# Patient Record
Sex: FEMALE | Race: WHITE | NOT HISPANIC OR LATINO
[De-identification: names, ages, dates, MRNs, and addresses within clinical notes are randomized per-mention and may not be internally consistent; named-entity substitution may affect disease eponyms.]

---

## 2017-07-06 ENCOUNTER — APPOINTMENT (OUTPATIENT)
Dept: OPHTHALMOLOGY | Facility: CLINIC | Age: 32
End: 2017-07-06

## 2017-07-06 PROBLEM — Z00.00 ENCOUNTER FOR PREVENTIVE HEALTH EXAMINATION: Status: ACTIVE | Noted: 2017-07-06

## 2017-07-11 ENCOUNTER — APPOINTMENT (OUTPATIENT)
Dept: OPHTHALMOLOGY | Facility: CLINIC | Age: 32
End: 2017-07-11

## 2017-07-26 ENCOUNTER — APPOINTMENT (OUTPATIENT)
Dept: MRI IMAGING | Facility: IMAGING CENTER | Age: 32
End: 2017-07-26

## 2017-08-15 ENCOUNTER — APPOINTMENT (OUTPATIENT)
Dept: MRI IMAGING | Facility: CLINIC | Age: 32
End: 2017-08-15

## 2018-11-01 ENCOUNTER — TRANSCRIPTION ENCOUNTER (OUTPATIENT)
Age: 33
End: 2018-11-01

## 2018-11-01 ENCOUNTER — INPATIENT (INPATIENT)
Facility: HOSPITAL | Age: 33
LOS: 3 days | Discharge: HOME | End: 2018-11-05
Attending: OBSTETRICS & GYNECOLOGY | Admitting: OBSTETRICS & GYNECOLOGY
Payer: MEDICAID

## 2018-11-01 DIAGNOSIS — Z3A.00 WEEKS OF GESTATION OF PREGNANCY NOT SPECIFIED: ICD-10-CM

## 2018-11-01 DIAGNOSIS — O26.899 OTHER SPECIFIED PREGNANCY RELATED CONDITIONS, UNSPECIFIED TRIMESTER: ICD-10-CM

## 2018-11-02 ENCOUNTER — RESULT REVIEW (OUTPATIENT)
Age: 33
End: 2018-11-02

## 2018-11-02 VITALS — HEIGHT: 64 IN | WEIGHT: 182.98 LBS

## 2018-11-02 DIAGNOSIS — Z34.80 ENCOUNTER FOR SUPERVISION OF OTHER NORMAL PREGNANCY, UNSPECIFIED TRIMESTER: ICD-10-CM

## 2018-11-02 LAB
BASOPHILS # BLD AUTO: 0 K/UL — SIGNIFICANT CHANGE UP (ref 0–0.2)
BASOPHILS NFR BLD AUTO: 0.1 % — SIGNIFICANT CHANGE UP (ref 0–2)
BLD GP AB SCN SERPL QL: NEGATIVE — SIGNIFICANT CHANGE UP
EOSINOPHIL # BLD AUTO: 0 K/UL — SIGNIFICANT CHANGE UP (ref 0–0.5)
EOSINOPHIL NFR BLD AUTO: 0.2 % — SIGNIFICANT CHANGE UP (ref 0–6)
HBV SURFACE AG SERPL QL IA: SIGNIFICANT CHANGE UP
HCT VFR BLD CALC: 37.4 % — SIGNIFICANT CHANGE UP (ref 34.5–45)
HGB BLD-MCNC: 12.6 G/DL — SIGNIFICANT CHANGE UP (ref 11.5–15.5)
HIV 1 & 2 AB SERPL IA.RAPID: SIGNIFICANT CHANGE UP
HIV 1+2 AB+HIV1 P24 AG SERPL QL IA: SIGNIFICANT CHANGE UP
LYMPHOCYTES # BLD AUTO: 1.3 K/UL — SIGNIFICANT CHANGE UP (ref 1–3.3)
LYMPHOCYTES # BLD AUTO: 7.8 % — LOW (ref 13–44)
MCHC RBC-ENTMCNC: 28 PG — SIGNIFICANT CHANGE UP (ref 27–34)
MCHC RBC-ENTMCNC: 33.7 GM/DL — SIGNIFICANT CHANGE UP (ref 32–36)
MCV RBC AUTO: 83 FL — SIGNIFICANT CHANGE UP (ref 80–100)
MONOCYTES # BLD AUTO: 0.6 K/UL — SIGNIFICANT CHANGE UP (ref 0–0.9)
MONOCYTES NFR BLD AUTO: 3.8 % — SIGNIFICANT CHANGE UP (ref 2–14)
NEUTROPHILS # BLD AUTO: 14.8 K/UL — HIGH (ref 1.8–7.4)
NEUTROPHILS NFR BLD AUTO: 88.1 % — HIGH (ref 43–77)
PLATELET # BLD AUTO: 216 K/UL — SIGNIFICANT CHANGE UP (ref 150–400)
RBC # BLD: 4.5 M/UL — SIGNIFICANT CHANGE UP (ref 3.8–5.2)
RBC # FLD: 14.7 % — HIGH (ref 10.3–14.5)
RH IG SCN BLD-IMP: POSITIVE — SIGNIFICANT CHANGE UP
RH IG SCN BLD-IMP: POSITIVE — SIGNIFICANT CHANGE UP
RUBV IGG SER-ACNC: 7.8 INDEX — SIGNIFICANT CHANGE UP
RUBV IGG SER-IMP: POSITIVE — SIGNIFICANT CHANGE UP
T PALLIDUM AB TITR SER: NEGATIVE — SIGNIFICANT CHANGE UP
WBC # BLD: 16.9 K/UL — HIGH (ref 3.8–10.5)
WBC # FLD AUTO: 16.9 K/UL — HIGH (ref 3.8–10.5)

## 2018-11-02 PROCEDURE — 59514 CESAREAN DELIVERY ONLY: CPT | Mod: U7

## 2018-11-02 RX ORDER — NALOXONE HYDROCHLORIDE 4 MG/.1ML
0.1 SPRAY NASAL
Qty: 0 | Refills: 0 | Status: DISCONTINUED | OUTPATIENT
Start: 2018-11-02 | End: 2018-11-04

## 2018-11-02 RX ORDER — SODIUM CHLORIDE 9 MG/ML
1000 INJECTION, SOLUTION INTRAVENOUS
Qty: 0 | Refills: 0 | Status: DISCONTINUED | OUTPATIENT
Start: 2018-11-02 | End: 2018-11-05

## 2018-11-02 RX ORDER — DEXAMETHASONE 0.5 MG/5ML
4 ELIXIR ORAL EVERY 6 HOURS
Qty: 0 | Refills: 0 | Status: DISCONTINUED | OUTPATIENT
Start: 2018-11-02 | End: 2018-11-04

## 2018-11-02 RX ORDER — LANOLIN
1 OINTMENT (GRAM) TOPICAL
Qty: 0 | Refills: 0 | Status: DISCONTINUED | OUTPATIENT
Start: 2018-11-02 | End: 2018-11-05

## 2018-11-02 RX ORDER — OXYTOCIN 10 UNIT/ML
2 VIAL (ML) INJECTION
Qty: 30 | Refills: 0 | Status: DISCONTINUED | OUTPATIENT
Start: 2018-11-02 | End: 2018-11-05

## 2018-11-02 RX ORDER — OXYTOCIN 10 UNIT/ML
333.33 VIAL (ML) INJECTION
Qty: 20 | Refills: 0 | Status: DISCONTINUED | OUTPATIENT
Start: 2018-11-02 | End: 2018-11-05

## 2018-11-02 RX ORDER — ACETAMINOPHEN 500 MG
1000 TABLET ORAL ONCE
Qty: 0 | Refills: 0 | Status: COMPLETED | OUTPATIENT
Start: 2018-11-02 | End: 2018-11-02

## 2018-11-02 RX ORDER — SIMETHICONE 80 MG/1
80 TABLET, CHEWABLE ORAL EVERY 4 HOURS
Qty: 0 | Refills: 0 | Status: DISCONTINUED | OUTPATIENT
Start: 2018-11-02 | End: 2018-11-05

## 2018-11-02 RX ORDER — OXYTOCIN 10 UNIT/ML
41.67 VIAL (ML) INJECTION
Qty: 20 | Refills: 0 | Status: DISCONTINUED | OUTPATIENT
Start: 2018-11-02 | End: 2018-11-02

## 2018-11-02 RX ORDER — IBUPROFEN 200 MG
600 TABLET ORAL EVERY 6 HOURS
Qty: 0 | Refills: 0 | Status: COMPLETED | OUTPATIENT
Start: 2018-11-02 | End: 2019-10-01

## 2018-11-02 RX ORDER — TETANUS TOXOID, REDUCED DIPHTHERIA TOXOID AND ACELLULAR PERTUSSIS VACCINE, ADSORBED 5; 2.5; 8; 8; 2.5 [IU]/.5ML; [IU]/.5ML; UG/.5ML; UG/.5ML; UG/.5ML
0.5 SUSPENSION INTRAMUSCULAR ONCE
Qty: 0 | Refills: 0 | Status: DISCONTINUED | OUTPATIENT
Start: 2018-11-02 | End: 2018-11-05

## 2018-11-02 RX ORDER — SODIUM CHLORIDE 9 MG/ML
1000 INJECTION, SOLUTION INTRAVENOUS
Qty: 0 | Refills: 0 | Status: DISCONTINUED | OUTPATIENT
Start: 2018-11-02 | End: 2018-11-02

## 2018-11-02 RX ORDER — GENTAMICIN SULFATE 40 MG/ML
400 VIAL (ML) INJECTION EVERY 24 HOURS
Qty: 0 | Refills: 0 | Status: DISCONTINUED | OUTPATIENT
Start: 2018-11-02 | End: 2018-11-04

## 2018-11-02 RX ORDER — DOCUSATE SODIUM 100 MG
100 CAPSULE ORAL
Qty: 0 | Refills: 0 | Status: DISCONTINUED | OUTPATIENT
Start: 2018-11-02 | End: 2018-11-03

## 2018-11-02 RX ORDER — ONDANSETRON 8 MG/1
4 TABLET, FILM COATED ORAL EVERY 6 HOURS
Qty: 0 | Refills: 0 | Status: DISCONTINUED | OUTPATIENT
Start: 2018-11-02 | End: 2018-11-04

## 2018-11-02 RX ORDER — FERROUS SULFATE 325(65) MG
1 TABLET ORAL
Qty: 0 | Refills: 0 | COMMUNITY

## 2018-11-02 RX ORDER — OXYTOCIN 10 UNIT/ML
41.67 VIAL (ML) INJECTION
Qty: 20 | Refills: 0 | Status: DISCONTINUED | OUTPATIENT
Start: 2018-11-02 | End: 2018-11-05

## 2018-11-02 RX ORDER — OXYCODONE HYDROCHLORIDE 5 MG/1
5 TABLET ORAL
Qty: 0 | Refills: 0 | Status: COMPLETED | OUTPATIENT
Start: 2018-11-02 | End: 2018-11-09

## 2018-11-02 RX ORDER — KETOROLAC TROMETHAMINE 30 MG/ML
30 SYRINGE (ML) INJECTION EVERY 6 HOURS
Qty: 0 | Refills: 0 | Status: DISCONTINUED | OUTPATIENT
Start: 2018-11-02 | End: 2018-11-04

## 2018-11-02 RX ORDER — CITRIC ACID/SODIUM CITRATE 300-500 MG
15 SOLUTION, ORAL ORAL EVERY 4 HOURS
Qty: 0 | Refills: 0 | Status: DISCONTINUED | OUTPATIENT
Start: 2018-11-02 | End: 2018-11-02

## 2018-11-02 RX ORDER — AMPICILLIN TRIHYDRATE 250 MG
2 CAPSULE ORAL EVERY 6 HOURS
Qty: 0 | Refills: 0 | Status: DISCONTINUED | OUTPATIENT
Start: 2018-11-02 | End: 2018-11-03

## 2018-11-02 RX ORDER — FERROUS SULFATE 325(65) MG
325 TABLET ORAL DAILY
Qty: 0 | Refills: 0 | Status: DISCONTINUED | OUTPATIENT
Start: 2018-11-02 | End: 2018-11-03

## 2018-11-02 RX ORDER — GLYCERIN ADULT
1 SUPPOSITORY, RECTAL RECTAL AT BEDTIME
Qty: 0 | Refills: 0 | Status: DISCONTINUED | OUTPATIENT
Start: 2018-11-02 | End: 2018-11-05

## 2018-11-02 RX ORDER — ACETAMINOPHEN 500 MG
975 TABLET ORAL EVERY 6 HOURS
Qty: 0 | Refills: 0 | Status: DISCONTINUED | OUTPATIENT
Start: 2018-11-02 | End: 2018-11-05

## 2018-11-02 RX ORDER — OXYCODONE HYDROCHLORIDE 5 MG/1
5 TABLET ORAL EVERY 4 HOURS
Qty: 0 | Refills: 0 | Status: COMPLETED | OUTPATIENT
Start: 2018-11-02 | End: 2018-11-09

## 2018-11-02 RX ORDER — OXYTOCIN 10 UNIT/ML
333.33 VIAL (ML) INJECTION
Qty: 20 | Refills: 0 | Status: DISCONTINUED | OUTPATIENT
Start: 2018-11-02 | End: 2018-11-02

## 2018-11-02 RX ORDER — INFLUENZA VIRUS VACCINE 15; 15; 15; 15 UG/.5ML; UG/.5ML; UG/.5ML; UG/.5ML
0.5 SUSPENSION INTRAMUSCULAR ONCE
Qty: 0 | Refills: 0 | Status: COMPLETED | OUTPATIENT
Start: 2018-11-02 | End: 2018-11-04

## 2018-11-02 RX ORDER — DIPHENHYDRAMINE HCL 50 MG
25 CAPSULE ORAL EVERY 6 HOURS
Qty: 0 | Refills: 0 | Status: DISCONTINUED | OUTPATIENT
Start: 2018-11-02 | End: 2018-11-05

## 2018-11-02 RX ORDER — SODIUM CHLORIDE 9 MG/ML
1000 INJECTION, SOLUTION INTRAVENOUS ONCE
Qty: 0 | Refills: 0 | Status: COMPLETED | OUTPATIENT
Start: 2018-11-02 | End: 2018-11-02

## 2018-11-02 RX ORDER — HEPARIN SODIUM 5000 [USP'U]/ML
5000 INJECTION INTRAVENOUS; SUBCUTANEOUS EVERY 12 HOURS
Qty: 0 | Refills: 0 | Status: DISCONTINUED | OUTPATIENT
Start: 2018-11-02 | End: 2018-11-05

## 2018-11-02 RX ADMIN — HEPARIN SODIUM 5000 UNIT(S): 5000 INJECTION INTRAVENOUS; SUBCUTANEOUS at 18:45

## 2018-11-02 RX ADMIN — SIMETHICONE 80 MILLIGRAM(S): 80 TABLET, CHEWABLE ORAL at 17:52

## 2018-11-02 RX ADMIN — Medication 2 MILLIUNIT(S)/MIN: at 06:23

## 2018-11-02 RX ADMIN — Medication 30 MILLIGRAM(S): at 18:39

## 2018-11-02 RX ADMIN — SODIUM CHLORIDE 125 MILLILITER(S): 9 INJECTION, SOLUTION INTRAVENOUS at 02:35

## 2018-11-02 RX ADMIN — Medication 975 MILLIGRAM(S): at 18:39

## 2018-11-02 RX ADMIN — Medication 30 MILLIGRAM(S): at 12:57

## 2018-11-02 RX ADMIN — Medication 216 GRAM(S): at 13:02

## 2018-11-02 RX ADMIN — Medication 975 MILLIGRAM(S): at 17:52

## 2018-11-02 RX ADMIN — Medication 150 MILLIGRAM(S): at 15:55

## 2018-11-02 RX ADMIN — Medication 216 GRAM(S): at 17:53

## 2018-11-02 RX ADMIN — Medication 400 MILLIGRAM(S): at 10:43

## 2018-11-02 RX ADMIN — SODIUM CHLORIDE 2000 MILLILITER(S): 9 INJECTION, SOLUTION INTRAVENOUS at 01:45

## 2018-11-02 RX ADMIN — Medication 30 MILLIGRAM(S): at 17:52

## 2018-11-03 LAB
BASOPHILS # BLD AUTO: 0.01 K/UL — SIGNIFICANT CHANGE UP (ref 0–0.2)
BASOPHILS NFR BLD AUTO: 0.1 % — SIGNIFICANT CHANGE UP (ref 0–2)
EOSINOPHIL # BLD AUTO: 0.11 K/UL — SIGNIFICANT CHANGE UP (ref 0–0.5)
EOSINOPHIL NFR BLD AUTO: 0.9 % — SIGNIFICANT CHANGE UP (ref 0–6)
HCT VFR BLD CALC: 25 % — LOW (ref 34.5–45)
HGB BLD-MCNC: 8.2 G/DL — LOW (ref 11.5–15.5)
IMM GRANULOCYTES NFR BLD AUTO: 0.3 % — SIGNIFICANT CHANGE UP (ref 0–1.5)
LYMPHOCYTES # BLD AUTO: 1.84 K/UL — SIGNIFICANT CHANGE UP (ref 1–3.3)
LYMPHOCYTES # BLD AUTO: 15.1 % — SIGNIFICANT CHANGE UP (ref 13–44)
MCHC RBC-ENTMCNC: 27.8 PG — SIGNIFICANT CHANGE UP (ref 27–34)
MCHC RBC-ENTMCNC: 32.8 GM/DL — SIGNIFICANT CHANGE UP (ref 32–36)
MCV RBC AUTO: 84.7 FL — SIGNIFICANT CHANGE UP (ref 80–100)
MONOCYTES # BLD AUTO: 0.8 K/UL — SIGNIFICANT CHANGE UP (ref 0–0.9)
MONOCYTES NFR BLD AUTO: 6.6 % — SIGNIFICANT CHANGE UP (ref 2–14)
NEUTROPHILS # BLD AUTO: 9.4 K/UL — HIGH (ref 1.8–7.4)
NEUTROPHILS NFR BLD AUTO: 77 % — SIGNIFICANT CHANGE UP (ref 43–77)
PLATELET # BLD AUTO: 138 K/UL — LOW (ref 150–400)
RBC # BLD: 2.95 M/UL — LOW (ref 3.8–5.2)
RBC # FLD: 16 % — HIGH (ref 10.3–14.5)
WBC # BLD: 12.2 K/UL — HIGH (ref 3.8–10.5)
WBC # FLD AUTO: 12.2 K/UL — HIGH (ref 3.8–10.5)

## 2018-11-03 RX ORDER — FERROUS SULFATE 325(65) MG
325 TABLET ORAL THREE TIMES A DAY
Qty: 0 | Refills: 0 | Status: DISCONTINUED | OUTPATIENT
Start: 2018-11-03 | End: 2018-11-05

## 2018-11-03 RX ORDER — DOCUSATE SODIUM 100 MG
100 CAPSULE ORAL THREE TIMES A DAY
Qty: 0 | Refills: 0 | Status: DISCONTINUED | OUTPATIENT
Start: 2018-11-03 | End: 2018-11-05

## 2018-11-03 RX ORDER — ASCORBIC ACID 60 MG
500 TABLET,CHEWABLE ORAL DAILY
Qty: 0 | Refills: 0 | Status: DISCONTINUED | OUTPATIENT
Start: 2018-11-03 | End: 2018-11-05

## 2018-11-03 RX ADMIN — Medication 975 MILLIGRAM(S): at 05:27

## 2018-11-03 RX ADMIN — Medication 1 TABLET(S): at 11:23

## 2018-11-03 RX ADMIN — Medication 30 MILLIGRAM(S): at 12:00

## 2018-11-03 RX ADMIN — Medication 30 MILLIGRAM(S): at 00:01

## 2018-11-03 RX ADMIN — Medication 100 MILLIGRAM(S): at 22:04

## 2018-11-03 RX ADMIN — Medication 325 MILLIGRAM(S): at 11:24

## 2018-11-03 RX ADMIN — SIMETHICONE 80 MILLIGRAM(S): 80 TABLET, CHEWABLE ORAL at 05:25

## 2018-11-03 RX ADMIN — Medication 100 MILLIGRAM(S): at 06:35

## 2018-11-03 RX ADMIN — SIMETHICONE 80 MILLIGRAM(S): 80 TABLET, CHEWABLE ORAL at 00:03

## 2018-11-03 RX ADMIN — HEPARIN SODIUM 5000 UNIT(S): 5000 INJECTION INTRAVENOUS; SUBCUTANEOUS at 18:26

## 2018-11-03 RX ADMIN — Medication 216 GRAM(S): at 11:25

## 2018-11-03 RX ADMIN — Medication 975 MILLIGRAM(S): at 12:00

## 2018-11-03 RX ADMIN — Medication 975 MILLIGRAM(S): at 01:00

## 2018-11-03 RX ADMIN — Medication 975 MILLIGRAM(S): at 18:56

## 2018-11-03 RX ADMIN — Medication 30 MILLIGRAM(S): at 06:25

## 2018-11-03 RX ADMIN — Medication 25 MILLIGRAM(S): at 05:29

## 2018-11-03 RX ADMIN — Medication 325 MILLIGRAM(S): at 22:04

## 2018-11-03 RX ADMIN — Medication 150 MILLIGRAM(S): at 16:00

## 2018-11-03 RX ADMIN — Medication 975 MILLIGRAM(S): at 00:03

## 2018-11-03 RX ADMIN — Medication 975 MILLIGRAM(S): at 18:26

## 2018-11-03 RX ADMIN — Medication 30 MILLIGRAM(S): at 05:26

## 2018-11-03 RX ADMIN — Medication 975 MILLIGRAM(S): at 11:23

## 2018-11-03 RX ADMIN — Medication 30 MILLIGRAM(S): at 01:00

## 2018-11-03 RX ADMIN — SODIUM CHLORIDE 125 MILLILITER(S): 9 INJECTION, SOLUTION INTRAVENOUS at 11:16

## 2018-11-03 RX ADMIN — Medication 500 MILLIGRAM(S): at 16:06

## 2018-11-03 RX ADMIN — Medication 975 MILLIGRAM(S): at 06:25

## 2018-11-03 RX ADMIN — Medication 30 MILLIGRAM(S): at 18:26

## 2018-11-03 RX ADMIN — Medication 216 GRAM(S): at 00:03

## 2018-11-03 RX ADMIN — Medication 100 MILLIGRAM(S): at 14:12

## 2018-11-03 RX ADMIN — Medication 325 MILLIGRAM(S): at 14:12

## 2018-11-03 RX ADMIN — Medication 100 MILLIGRAM(S): at 14:00

## 2018-11-03 RX ADMIN — HEPARIN SODIUM 5000 UNIT(S): 5000 INJECTION INTRAVENOUS; SUBCUTANEOUS at 05:27

## 2018-11-03 RX ADMIN — Medication 30 MILLIGRAM(S): at 11:23

## 2018-11-03 RX ADMIN — Medication 216 GRAM(S): at 05:44

## 2018-11-03 RX ADMIN — Medication 30 MILLIGRAM(S): at 18:56

## 2018-11-03 NOTE — PROGRESS NOTE ADULT - SUBJECTIVE AND OBJECTIVE BOX
Day 1 of Anesthesia Pain Management Service    SUBJECTIVE: I'm okay  Pain Scale Score	At rest: ___ 	With Activity: ___ 	[  x ] Refer to charted pain scores    THERAPY:  [ ] Epidural Bupivacaine 0.0625% and Hydromorphone 10 micrograms/mL  [ ] Epidural Ropivacaine 0.2% plain   [ x ] Epidural Bupivacaine 0.01 % and Fentanyl 3 micrograms/mL  (OB)    Demand dose 3 mL  Lockout  15 minutes   Continuous Rate 10mL    MEDICATIONS  (STANDING):  acetaminophen   Tablet .. 975 milliGRAM(s) Oral every 6 hours  ascorbic acid 500 milliGRAM(s) Oral daily  clindamycin IVPB 900 milliGRAM(s) IV Intermittent every 8 hours  clindamycin IVPB      diphtheria/tetanus/pertussis (acellular) Vaccine (ADAcel) 0.5 milliLiter(s) IntraMuscular once  docusate sodium 100 milliGRAM(s) Oral three times a day  fentaNYL (3 MICROgram(s)/mL) + BUpivacaine 0.01% in 0.9% Sodium Chloride PCEA 250 milliLiter(s) Epidural PCA Continuous  ferrous    sulfate 325 milliGRAM(s) Oral three times a day  gentamicin   IVPB 400 milliGRAM(s) IV Intermittent every 24 hours  heparin  Injectable 5000 Unit(s) SubCutaneous every 12 hours  ibuprofen  Tablet. 600 milliGRAM(s) Oral every 6 hours  influenza   Vaccine 0.5 milliLiter(s) IntraMuscular once  ketorolac   Injectable 30 milliGRAM(s) IV Push every 6 hours  lactated ringers. 1000 milliLiter(s) (125 mL/Hr) IV Continuous <Continuous>  oxyCODONE    IR 5 milliGRAM(s) Oral every 3 hours  oxytocin Infusion 41.667 milliUNIT(s)/Min (125 mL/Hr) IV Continuous <Continuous>  oxytocin Infusion 333.333 milliUNIT(s)/Min (1000 mL/Hr) IV Continuous <Continuous>  oxytocin Infusion 2 milliUNIT(s)/Min (2 mL/Hr) IV Continuous <Continuous>  prenatal multivitamin 1 Tablet(s) Oral daily    MEDICATIONS  (PRN):  dexamethasone  Injectable 4 milliGRAM(s) IV Push every 6 hours PRN Nausea, IF ondansetron is ineffective after 30 - 60 minutes  diphenhydrAMINE 25 milliGRAM(s) Oral every 6 hours PRN Itching  fentaNYL (3 MICROgram(s)/mL) + BUpivacaine 0.01% in 0.9% Sodium Chloride PCEA Rescue Clinician Bolus 5 milliLiter(s) Epidural every 15 minutes PRN Moderate Pain (4 - 6)  glycerin Suppository - Adult 1 Suppository(s) Rectal at bedtime PRN Constipation  lanolin Ointment 1 Application(s) Topical every 3 hours PRN Sore Nipples  naloxone Injectable 0.1 milliGRAM(s) IV Push every 3 minutes PRN For ANY of the following changes in patient status:  A. RR LESS THAN 10 breaths per minute, B. Oxygen saturation LESS THAN 90%, C. Sedation score of 6  ondansetron Injectable 4 milliGRAM(s) IV Push every 6 hours PRN Nausea  oxyCODONE    IR 5 milliGRAM(s) Oral every 4 hours PRN Severe Pain (7 - 10)  simethicone 80 milliGRAM(s) Chew every 4 hours PRN Gas      OBJECTIVE:    Assessment of Epidural Catheter Site: 	    [x  ] Dressing intact	[x ] Site non-tender	[x ] Site without erythema, discharge, edema  [x  ] Epidural tubing and connection checked	[ x] Gross neurological exam within normal limits  [ ] Catheter removed – tip intact		                          8.2    12.20 )-----------( 138      ( 03 Nov 2018 08:35 )             25.0     Vital Signs Last 24 Hrs  T(C): 36.7 (11-03-18 @ 14:00), Max: 38.3 (11-03-18 @ 10:50)  T(F): 98.1 (11-03-18 @ 14:00), Max: 100.9 (11-03-18 @ 10:50)  HR: 113 (11-03-18 @ 14:00) (82 - 130)  BP: 107/73 (11-03-18 @ 14:00) (96/64 - 136/83)  BP(mean): --  RR: 20 (11-03-18 @ 14:00) (17 - 20)  SpO2: 96% (11-03-18 @ 14:00) (96% - 99%)      Sedation Score:	[x ] Alert	[ ] Drowsy	[ ] Arousable  [ ] Asleep  [ ] Unresponsive    Side Effects:	[ x] None	[ ] Nausea	[ ] Vomiting  [ ] Pruritus  		[ ] Weakness  [ ] Numbness  [ ] Other:    ASSESSMENT/ PLAN:    Therapy to  be:	[  x] Continue   [ ] Discontinued   [ ] Change to prn Analgesics    Documentation and Verification of current medications:  [ X ] Done	[ ] Not done, not eligible, reason:    Comments:

## 2018-11-03 NOTE — PROGRESS NOTE ADULT - SUBJECTIVE AND OBJECTIVE BOX
OB Progress Note:  Delivery, POD#1    S: 34yo POD#1 s/p LTCS c/b IPT 38.9. Called to bedside for tachycardia 150s and T 38.3 in addition to shaking. Denies any lightheadedness or dizziness. No N/V/D. No SOB or chest pain.   Her pain is well controlled. She is tolerating a regular diet and passing flatus.   She is ambulating without difficulty.   Voiding spontaneously.     O:   Vital Signs Last 24 Hrs  T(C): 36.9 (2018 05:19), Max: 37.3 (2018 12:30)  T(F): 98.4 (2018 05:19), Max: 99.1 (2018 12:30)  HR: 99 (2018 05:19) (82 - 120)  BP: 109/70 (2018 05:19) (102/70 - 123/74)  BP(mean): 78 (2018 14:15) (78 - 91)  RR: 18 (2018 05:19) (17 - 33)  SpO2: 96% (2018 05:19) (96% - 99%)    Labs:  Blood type: A Positive  Rubella IgG: Positive ( @ 03:32)  RPR: Negative                          8.2<L>   12.20<H> >-----------< 138<L>    (  @ 08:35 )             25.0<L>                        12.6   16.9<H> >-----------< 216    (  @ 02:07 )             37.4              On manual palpation, HR 140s    PE:  General: laying supine, shaking  Chest: lungs CTAB. Breasts non-tender and non-erythematous.  Heart: S1 S2 RRR.  Abdomen: Mildly distended, appropriately tender, incision c/d/i. Mild fundal tenderness.  Extremities: No erythema, no pitting edema. Rapid pulses B/L

## 2018-11-03 NOTE — CHART NOTE - NSCHARTNOTEFT_GEN_A_CORE
PA NOTE        POD#1         Vital Signs Last 24 Hrs  T(C): 36.9 (2018 05:19), Max: 37.3 (2018 12:30)  T(F): 98.4 (2018 05:19), Max: 99.1 (2018 12:30)  HR: 99 (2018 05:19) (82 - 120)  BP: 109/70 (2018 05:19) (102/70 - 123/74)  BP(mean): 78 (2018 14:15) (78 - 91)  RR: 18 (2018 05:19) (17 - 33)  SpO2: 96% (2018 05:19) (96% - 99%)               8.2    12.20 )-----------( 138      (  @ 08:35 )             25.0                12.6   16.9  )-----------( 216      (  @ 02:07 )             37.4           Assessment: Anemia secondary to acute blood loss due to delivery    Plan:  - Ferrous Sulfate, Colace, Vitamin C supplementation.  - Monitor for signs/symptoms of anemia.   - Does not require transfusion at this time

## 2018-11-03 NOTE — PROGRESS NOTE ADULT - PROBLEM SELECTOR PLAN 1
- IVF  - IV tylenol as needed  - Continue gentamicin and clinda, d/c amp (not providing extra coverage)  - Continue regular diet.  - Monitor temps, HR  - Increase ambulation.  - Continue PCEA for pain.  - F/u AM CBC    Marissa Yap PGY1

## 2018-11-03 NOTE — PROGRESS NOTE ADULT - ATTENDING COMMENTS
I have seen and evaluated this patient. I agree with the above assessment and plan. Pt is POD#1 s/p C/S w chorio in labor, now being treated for endometritis. Will continue to monitor, if patient has another fever plan to get blood and urine cultures.

## 2018-11-04 RX ORDER — OXYCODONE HYDROCHLORIDE 5 MG/1
5 TABLET ORAL EVERY 4 HOURS
Qty: 0 | Refills: 0 | Status: DISCONTINUED | OUTPATIENT
Start: 2018-11-04 | End: 2018-11-05

## 2018-11-04 RX ORDER — IBUPROFEN 200 MG
600 TABLET ORAL EVERY 6 HOURS
Qty: 0 | Refills: 0 | Status: DISCONTINUED | OUTPATIENT
Start: 2018-11-04 | End: 2018-11-05

## 2018-11-04 RX ORDER — OXYCODONE HYDROCHLORIDE 5 MG/1
5 TABLET ORAL
Qty: 0 | Refills: 0 | Status: DISCONTINUED | OUTPATIENT
Start: 2018-11-04 | End: 2018-11-05

## 2018-11-04 RX ADMIN — OXYCODONE HYDROCHLORIDE 5 MILLIGRAM(S): 5 TABLET ORAL at 15:25

## 2018-11-04 RX ADMIN — OXYCODONE HYDROCHLORIDE 5 MILLIGRAM(S): 5 TABLET ORAL at 23:05

## 2018-11-04 RX ADMIN — Medication 100 MILLIGRAM(S): at 08:48

## 2018-11-04 RX ADMIN — Medication 30 MILLIGRAM(S): at 02:40

## 2018-11-04 RX ADMIN — Medication 25 MILLIGRAM(S): at 01:02

## 2018-11-04 RX ADMIN — Medication 975 MILLIGRAM(S): at 21:50

## 2018-11-04 RX ADMIN — OXYCODONE HYDROCHLORIDE 5 MILLIGRAM(S): 5 TABLET ORAL at 11:40

## 2018-11-04 RX ADMIN — Medication 975 MILLIGRAM(S): at 00:18

## 2018-11-04 RX ADMIN — INFLUENZA VIRUS VACCINE 0.5 MILLILITER(S): 15; 15; 15; 15 SUSPENSION INTRAMUSCULAR at 21:06

## 2018-11-04 RX ADMIN — OXYCODONE HYDROCHLORIDE 5 MILLIGRAM(S): 5 TABLET ORAL at 21:06

## 2018-11-04 RX ADMIN — Medication 975 MILLIGRAM(S): at 15:25

## 2018-11-04 RX ADMIN — Medication 30 MILLIGRAM(S): at 00:17

## 2018-11-04 RX ADMIN — Medication 975 MILLIGRAM(S): at 16:00

## 2018-11-04 RX ADMIN — Medication 30 MILLIGRAM(S): at 07:00

## 2018-11-04 RX ADMIN — OXYCODONE HYDROCHLORIDE 5 MILLIGRAM(S): 5 TABLET ORAL at 12:30

## 2018-11-04 RX ADMIN — Medication 325 MILLIGRAM(S): at 21:06

## 2018-11-04 RX ADMIN — OXYCODONE HYDROCHLORIDE 5 MILLIGRAM(S): 5 TABLET ORAL at 09:30

## 2018-11-04 RX ADMIN — OXYCODONE HYDROCHLORIDE 5 MILLIGRAM(S): 5 TABLET ORAL at 16:00

## 2018-11-04 RX ADMIN — Medication 325 MILLIGRAM(S): at 08:48

## 2018-11-04 RX ADMIN — Medication 325 MILLIGRAM(S): at 14:18

## 2018-11-04 RX ADMIN — OXYCODONE HYDROCHLORIDE 5 MILLIGRAM(S): 5 TABLET ORAL at 21:50

## 2018-11-04 RX ADMIN — Medication 975 MILLIGRAM(S): at 21:06

## 2018-11-04 RX ADMIN — OXYCODONE HYDROCHLORIDE 5 MILLIGRAM(S): 5 TABLET ORAL at 17:59

## 2018-11-04 RX ADMIN — Medication 975 MILLIGRAM(S): at 06:07

## 2018-11-04 RX ADMIN — Medication 600 MILLIGRAM(S): at 11:40

## 2018-11-04 RX ADMIN — Medication 100 MILLIGRAM(S): at 14:18

## 2018-11-04 RX ADMIN — Medication 500 MILLIGRAM(S): at 11:39

## 2018-11-04 RX ADMIN — Medication 1 TABLET(S): at 11:39

## 2018-11-04 RX ADMIN — HEPARIN SODIUM 5000 UNIT(S): 5000 INJECTION INTRAVENOUS; SUBCUTANEOUS at 06:05

## 2018-11-04 RX ADMIN — Medication 100 MILLIGRAM(S): at 06:08

## 2018-11-04 RX ADMIN — OXYCODONE HYDROCHLORIDE 5 MILLIGRAM(S): 5 TABLET ORAL at 22:41

## 2018-11-04 RX ADMIN — Medication 975 MILLIGRAM(S): at 07:00

## 2018-11-04 RX ADMIN — HEPARIN SODIUM 5000 UNIT(S): 5000 INJECTION INTRAVENOUS; SUBCUTANEOUS at 17:59

## 2018-11-04 RX ADMIN — Medication 30 MILLIGRAM(S): at 06:05

## 2018-11-04 RX ADMIN — SIMETHICONE 80 MILLIGRAM(S): 80 TABLET, CHEWABLE ORAL at 00:18

## 2018-11-04 RX ADMIN — Medication 600 MILLIGRAM(S): at 12:30

## 2018-11-04 RX ADMIN — Medication 975 MILLIGRAM(S): at 01:15

## 2018-11-04 RX ADMIN — Medication 100 MILLIGRAM(S): at 21:06

## 2018-11-04 RX ADMIN — Medication 600 MILLIGRAM(S): at 17:59

## 2018-11-04 RX ADMIN — OXYCODONE HYDROCHLORIDE 5 MILLIGRAM(S): 5 TABLET ORAL at 08:53

## 2018-11-04 NOTE — PROGRESS NOTE ADULT - SUBJECTIVE AND OBJECTIVE BOX
Day 2 of Anesthesia Pain Management Service    SUBJECTIVE: I'm okay  Pain Scale Score:    [X] Refer to charted pain scores    THERAPY: Epidural Bupivacaine 0.01 % and Fentanyl 3 micrograms/mL     Demand Dose: 3 mL  Lockout: 15 minutes   Continuous Rate:  10 mL    MEDICATIONS  (STANDING):  acetaminophen   Tablet .. 975 milliGRAM(s) Oral every 6 hours  ascorbic acid 500 milliGRAM(s) Oral daily  clindamycin IVPB 900 milliGRAM(s) IV Intermittent every 8 hours  clindamycin IVPB      diphtheria/tetanus/pertussis (acellular) Vaccine (ADAcel) 0.5 milliLiter(s) IntraMuscular once  docusate sodium 100 milliGRAM(s) Oral three times a day  ferrous    sulfate 325 milliGRAM(s) Oral three times a day  gentamicin   IVPB 400 milliGRAM(s) IV Intermittent every 24 hours  heparin  Injectable 5000 Unit(s) SubCutaneous every 12 hours  ibuprofen  Tablet. 600 milliGRAM(s) Oral every 6 hours  influenza   Vaccine 0.5 milliLiter(s) IntraMuscular once  lactated ringers. 1000 milliLiter(s) (125 mL/Hr) IV Continuous <Continuous>  oxyCODONE    IR 5 milliGRAM(s) Oral every 3 hours  oxytocin Infusion 41.667 milliUNIT(s)/Min (125 mL/Hr) IV Continuous <Continuous>  oxytocin Infusion 333.333 milliUNIT(s)/Min (1000 mL/Hr) IV Continuous <Continuous>  oxytocin Infusion 2 milliUNIT(s)/Min (2 mL/Hr) IV Continuous <Continuous>  prenatal multivitamin 1 Tablet(s) Oral daily    MEDICATIONS  (PRN):  diphenhydrAMINE 25 milliGRAM(s) Oral every 6 hours PRN Itching  glycerin Suppository - Adult 1 Suppository(s) Rectal at bedtime PRN Constipation  lanolin Ointment 1 Application(s) Topical every 3 hours PRN Sore Nipples  oxyCODONE    IR 5 milliGRAM(s) Oral every 4 hours PRN Severe Pain (7 - 10)  simethicone 80 milliGRAM(s) Chew every 4 hours PRN Gas      OBJECTIVE:    Assessment of Epidural Catheter Site: 	    [ ] Dressing intact	[X] Site non-tender	[X] Site without erythema, discharge, edema  [ ] Epidural tubing and connection checked	[X] Gross neurological exam within normal limits  [X] Catheter removed                          8.2    12.20 )-----------( 138      ( 03 Nov 2018 08:35 )             25.0     Vital Signs Last 24 Hrs  T(C): 36.9 (11-04-18 @ 08:45), Max: 38.3 (11-03-18 @ 10:50)  T(F): 98.4 (11-04-18 @ 08:45), Max: 100.9 (11-03-18 @ 10:50)  HR: 100 (11-04-18 @ 08:45) (86 - 130)  BP: 118/71 (11-04-18 @ 08:45) (101/60 - 136/83)  BP(mean): --  RR: 18 (11-04-18 @ 08:45) (18 - 20)  SpO2: 95% (11-04-18 @ 08:45) (95% - 97%)      Sedation Score:	[X] Alert	[ ] Drowsy	[ ] Arousable  [ ] Asleep  [ ] Unresponsive    Side Effects:	[X] None	[ ] Nausea	[ ] Vomiting  [ ] Pruritus  		[ ] Weakness  [ ] Numbness  [ ] Other:    ASSESSMENT/ PLAN:    Therapy:                         [ ] Continue   [X] Discontinue   [X] Change to PRN Analgesics    Documentation and Verification of current medications:  [X] Done	[ ] Not done, not eligible, reason:    Comments:

## 2018-11-04 NOTE — PROGRESS NOTE ADULT - ASSESSMENT
A/P: 32yo POD#2 s/p LTCS. Patient had a fever intrapartum and postpartum and is status post antibiotics. Denies symptoms of infection at this time.  Patient is stable and doing well post-operatively.

## 2018-11-04 NOTE — PROGRESS NOTE ADULT - ATTENDING COMMENTS
Patient seen and examined at bedside, agree with above plan. patient c/o R ear pain, states it radiates down her jaw, unable to sleep on that side, examined bedside yesterday but no offical ENT eval, will consult ENT today for exam

## 2018-11-04 NOTE — PROGRESS NOTE ADULT - SUBJECTIVE AND OBJECTIVE BOX
OB Progress Note: LTCS, POD#2    S: 34yo POD#2 s/p LTCS. Pain is well controlled. She is tolerating a regular diet and passing flatus. She is voiding spontaneously, and ambulating without difficulty. Denies CP/SOB. Denies lightheadedness/dizziness. Denies N/V.    O:  Vitals:  Vital Signs Last 24 Hrs  T(C): 36.5 (04 Nov 2018 05:05), Max: 38.3 (03 Nov 2018 10:50)  T(F): 97.7 (04 Nov 2018 05:05), Max: 100.9 (03 Nov 2018 10:50)  HR: 93 (04 Nov 2018 05:05) (86 - 130)  BP: 118/80 (04 Nov 2018 05:05) (96/64 - 136/83)  BP(mean): --  RR: 18 (04 Nov 2018 05:05) (18 - 20)  SpO2: 95% (04 Nov 2018 05:05) (95% - 97%)    MEDICATIONS  (STANDING):  acetaminophen   Tablet .. 975 milliGRAM(s) Oral every 6 hours  ascorbic acid 500 milliGRAM(s) Oral daily  clindamycin IVPB 900 milliGRAM(s) IV Intermittent every 8 hours  clindamycin IVPB      diphtheria/tetanus/pertussis (acellular) Vaccine (ADAcel) 0.5 milliLiter(s) IntraMuscular once  docusate sodium 100 milliGRAM(s) Oral three times a day  fentaNYL (3 MICROgram(s)/mL) + BUpivacaine 0.01% in 0.9% Sodium Chloride PCEA 250 milliLiter(s) Epidural PCA Continuous  ferrous    sulfate 325 milliGRAM(s) Oral three times a day  gentamicin   IVPB 400 milliGRAM(s) IV Intermittent every 24 hours  heparin  Injectable 5000 Unit(s) SubCutaneous every 12 hours  ibuprofen  Tablet. 600 milliGRAM(s) Oral every 6 hours  influenza   Vaccine 0.5 milliLiter(s) IntraMuscular once  ketorolac   Injectable 30 milliGRAM(s) IV Push every 6 hours  lactated ringers. 1000 milliLiter(s) (125 mL/Hr) IV Continuous <Continuous>  oxyCODONE    IR 5 milliGRAM(s) Oral every 3 hours  oxytocin Infusion 41.667 milliUNIT(s)/Min (125 mL/Hr) IV Continuous <Continuous>  oxytocin Infusion 333.333 milliUNIT(s)/Min (1000 mL/Hr) IV Continuous <Continuous>  oxytocin Infusion 2 milliUNIT(s)/Min (2 mL/Hr) IV Continuous <Continuous>  prenatal multivitamin 1 Tablet(s) Oral daily      MEDICATIONS  (PRN):  dexamethasone  Injectable 4 milliGRAM(s) IV Push every 6 hours PRN Nausea, IF ondansetron is ineffective after 30 - 60 minutes  diphenhydrAMINE 25 milliGRAM(s) Oral every 6 hours PRN Itching  fentaNYL (3 MICROgram(s)/mL) + BUpivacaine 0.01% in 0.9% Sodium Chloride PCEA Rescue Clinician Bolus 5 milliLiter(s) Epidural every 15 minutes PRN Moderate Pain (4 - 6)  glycerin Suppository - Adult 1 Suppository(s) Rectal at bedtime PRN Constipation  lanolin Ointment 1 Application(s) Topical every 3 hours PRN Sore Nipples  naloxone Injectable 0.1 milliGRAM(s) IV Push every 3 minutes PRN For ANY of the following changes in patient status:  A. RR LESS THAN 10 breaths per minute, B. Oxygen saturation LESS THAN 90%, C. Sedation score of 6  ondansetron Injectable 4 milliGRAM(s) IV Push every 6 hours PRN Nausea  oxyCODONE    IR 5 milliGRAM(s) Oral every 4 hours PRN Severe Pain (7 - 10)  simethicone 80 milliGRAM(s) Chew every 4 hours PRN Gas      Labs:  Blood type: A Positive  Rubella IgG: Positive (11-02 @ 03:32)  RPR: Negative                          8.2<L>   12.20<H> >-----------< 138<L>    ( 11-03 @ 08:35 )             25.0<L>                        12.6   16.9<H> >-----------< 216    ( 11-02 @ 02:07 )             37.4      PE:  General: NAD  Abdomen: Soft, appropriately tender, incision c/d/i.  Extremities: No erythema, no pitting edema

## 2018-11-05 ENCOUNTER — TRANSCRIPTION ENCOUNTER (OUTPATIENT)
Age: 33
End: 2018-11-05

## 2018-11-05 VITALS
TEMPERATURE: 99 F | SYSTOLIC BLOOD PRESSURE: 116 MMHG | HEART RATE: 88 BPM | OXYGEN SATURATION: 98 % | RESPIRATION RATE: 18 BRPM | DIASTOLIC BLOOD PRESSURE: 75 MMHG

## 2018-11-05 DIAGNOSIS — H69.81 OTHER SPECIFIED DISORDERS OF EUSTACHIAN TUBE, RIGHT EAR: ICD-10-CM

## 2018-11-05 LAB
HCT VFR BLD CALC: 22.9 % — LOW (ref 34.5–45)
HCT VFR BLD CALC: 23.7 % — LOW (ref 34.5–45)
HGB BLD-MCNC: 7.4 G/DL — LOW (ref 11.5–15.5)
HGB BLD-MCNC: 7.9 G/DL — LOW (ref 11.5–15.5)
MCHC RBC-ENTMCNC: 27.2 PG — SIGNIFICANT CHANGE UP (ref 27–34)
MCHC RBC-ENTMCNC: 32.3 GM/DL — SIGNIFICANT CHANGE UP (ref 32–36)
MCV RBC AUTO: 84.2 FL — SIGNIFICANT CHANGE UP (ref 80–100)
MEV IGM SER-ACNC: <20 AU/ML — SIGNIFICANT CHANGE UP (ref 0–19.9)
PLATELET # BLD AUTO: 194 K/UL — SIGNIFICANT CHANGE UP (ref 150–400)
RBC # BLD: 2.72 M/UL — LOW (ref 3.8–5.2)
RBC # FLD: 15.9 % — HIGH (ref 10.3–14.5)
WBC # BLD: 9.59 K/UL — SIGNIFICANT CHANGE UP (ref 3.8–10.5)
WBC # FLD AUTO: 9.59 K/UL — SIGNIFICANT CHANGE UP (ref 3.8–10.5)

## 2018-11-05 PROCEDURE — 87340 HEPATITIS B SURFACE AG IA: CPT

## 2018-11-05 PROCEDURE — 86780 TREPONEMA PALLIDUM: CPT

## 2018-11-05 PROCEDURE — 90686 IIV4 VACC NO PRSV 0.5 ML IM: CPT

## 2018-11-05 PROCEDURE — 86762 RUBELLA ANTIBODY: CPT

## 2018-11-05 PROCEDURE — 87389 HIV-1 AG W/HIV-1&-2 AB AG IA: CPT

## 2018-11-05 PROCEDURE — 59025 FETAL NON-STRESS TEST: CPT

## 2018-11-05 PROCEDURE — 86850 RBC ANTIBODY SCREEN: CPT

## 2018-11-05 PROCEDURE — 85018 HEMOGLOBIN: CPT

## 2018-11-05 PROCEDURE — 86901 BLOOD TYPING SEROLOGIC RH(D): CPT

## 2018-11-05 PROCEDURE — 59050 FETAL MONITOR W/REPORT: CPT

## 2018-11-05 PROCEDURE — 85014 HEMATOCRIT: CPT

## 2018-11-05 PROCEDURE — G0463: CPT

## 2018-11-05 PROCEDURE — 86703 HIV-1/HIV-2 1 RESULT ANTBDY: CPT

## 2018-11-05 PROCEDURE — 85027 COMPLETE CBC AUTOMATED: CPT

## 2018-11-05 PROCEDURE — 99222 1ST HOSP IP/OBS MODERATE 55: CPT

## 2018-11-05 PROCEDURE — 86900 BLOOD TYPING SEROLOGIC ABO: CPT

## 2018-11-05 RX ORDER — OXYCODONE HYDROCHLORIDE 5 MG/1
1 TABLET ORAL
Qty: 20 | Refills: 0 | OUTPATIENT
Start: 2018-11-05

## 2018-11-05 RX ORDER — ACETAMINOPHEN 500 MG
3 TABLET ORAL
Qty: 0 | Refills: 0 | COMMUNITY
Start: 2018-11-05

## 2018-11-05 RX ORDER — IBUPROFEN 200 MG
1 TABLET ORAL
Qty: 0 | Refills: 0 | COMMUNITY
Start: 2018-11-05

## 2018-11-05 RX ADMIN — Medication 500 MILLIGRAM(S): at 11:47

## 2018-11-05 RX ADMIN — Medication 975 MILLIGRAM(S): at 03:34

## 2018-11-05 RX ADMIN — OXYCODONE HYDROCHLORIDE 5 MILLIGRAM(S): 5 TABLET ORAL at 03:34

## 2018-11-05 RX ADMIN — OXYCODONE HYDROCHLORIDE 5 MILLIGRAM(S): 5 TABLET ORAL at 01:00

## 2018-11-05 RX ADMIN — OXYCODONE HYDROCHLORIDE 5 MILLIGRAM(S): 5 TABLET ORAL at 01:09

## 2018-11-05 RX ADMIN — Medication 1 TABLET(S): at 11:47

## 2018-11-05 RX ADMIN — Medication 600 MILLIGRAM(S): at 12:15

## 2018-11-05 RX ADMIN — Medication 325 MILLIGRAM(S): at 06:23

## 2018-11-05 RX ADMIN — OXYCODONE HYDROCHLORIDE 5 MILLIGRAM(S): 5 TABLET ORAL at 09:34

## 2018-11-05 RX ADMIN — Medication 600 MILLIGRAM(S): at 06:24

## 2018-11-05 RX ADMIN — HEPARIN SODIUM 5000 UNIT(S): 5000 INJECTION INTRAVENOUS; SUBCUTANEOUS at 06:23

## 2018-11-05 RX ADMIN — Medication 600 MILLIGRAM(S): at 00:26

## 2018-11-05 RX ADMIN — OXYCODONE HYDROCHLORIDE 5 MILLIGRAM(S): 5 TABLET ORAL at 04:10

## 2018-11-05 RX ADMIN — Medication 600 MILLIGRAM(S): at 01:09

## 2018-11-05 RX ADMIN — Medication 975 MILLIGRAM(S): at 09:30

## 2018-11-05 RX ADMIN — Medication 975 MILLIGRAM(S): at 16:08

## 2018-11-05 RX ADMIN — Medication 100 MILLIGRAM(S): at 06:23

## 2018-11-05 RX ADMIN — OXYCODONE HYDROCHLORIDE 5 MILLIGRAM(S): 5 TABLET ORAL at 16:07

## 2018-11-05 RX ADMIN — OXYCODONE HYDROCHLORIDE 5 MILLIGRAM(S): 5 TABLET ORAL at 06:23

## 2018-11-05 RX ADMIN — OXYCODONE HYDROCHLORIDE 5 MILLIGRAM(S): 5 TABLET ORAL at 00:26

## 2018-11-05 RX ADMIN — Medication 600 MILLIGRAM(S): at 11:47

## 2018-11-05 RX ADMIN — Medication 975 MILLIGRAM(S): at 04:10

## 2018-11-05 RX ADMIN — Medication 975 MILLIGRAM(S): at 09:33

## 2018-11-05 NOTE — DISCHARGE NOTE OB - PLAN OF CARE
Recover from  delivery Make your follow-up appointment with your doctor as ordered 2 week after discharge. No heavy lifting, driving, or strenuous activity for 6 weeks. Nothing per vagina such as tampons, intercourse, douches or tub baths for 6 weeks or until you see your doctor. Call your doctor with any signs and symptoms of infection such as fever, chills, nausea, or vomiting. Call your doctor with redness or swelling at the incision site, fluid leakage, or wound separation. Call your doctor if you're unable to tolerate food, have an increase in vaginal bleeding, or have difficulty urinating. Call your doctor if you have pain that is not relieved by your prescribed medications. Notify your doctor with any other concerns.   Call [phone] if you have any of these concerns in the next 6 weeks.

## 2018-11-05 NOTE — DISCHARGE NOTE OB - MEDICATION SUMMARY - MEDICATIONS TO TAKE
I will START or STAY ON the medications listed below when I get home from the hospital:    acetaminophen 325 mg oral tablet  -- 3 tab(s) by mouth every 6 hours  -- Indication: For pain    ibuprofen 600 mg oral tablet  -- 1 tab(s) by mouth every 6 hours  -- Indication: For pain    oxyCODONE 5 mg oral tablet  -- 1 tab(s) by mouth every 6 hours MDD:4  -- Caution federal law prohibits the transfer of this drug to any person other  than the person for whom it was prescribed.  It is very important that you take or use this exactly as directed.  Do not skip doses or discontinue unless directed by your doctor.  May cause drowsiness.  Alcohol may intensify this effect.  Use care when operating dangerous machinery.  This prescription cannot be refilled.  Using more of this medication than prescribed may cause serious breathing problems.    -- Indication: For pain    Prenatal 1 oral capsule  -- 1 cap(s) by mouth once a day  -- Indication: For home med    ferrous sulfate 325 mg (65 mg elemental iron) oral tablet  -- 1 tab(s) by mouth once a day  -- Indication: For home med

## 2018-11-05 NOTE — DISCHARGE NOTE OB - HOSPITAL COURSE
Patient underwent uncomplicated primary  section for arrest/chorio please see operative report for details. EBL:800  Hct:37.4->25 Patient's postoperative course was unremarkable and she remained hemodynamically stable and afebrile throuhout. Upon discharge on POD3 the patient is ambulating voiding and tolerating oral intake, pain is well controlled with oral medications and vital signs are stable.

## 2018-11-05 NOTE — CONSULT NOTE ADULT - PROBLEM SELECTOR RECOMMENDATION 9
Start Flonase 1 gtt B/L  F/U outpatient in ENT clinic for Audiogram [phone]  Care per primary team

## 2018-11-05 NOTE — CONSULT NOTE ADULT - ASSESSMENT
37yoF with right Ear buzzing/clogged sensation normal Ear exam with no evidence of infection likely Right Eustachian Tube Dysfunction

## 2018-11-05 NOTE — PROGRESS NOTE ADULT - ATTENDING COMMENTS
Agree with above. Patient seen by me.  Patient S/p C/S POD#3. patient c/o ringing in her ears. ENT consulted, no interventions at this time, if complaint persists, patinet can f/u with ENT as an outpatient. Patient anemic, + HA, no palpitations, no dizzyness. Hct 22, anemic, advised can manage conservatively or with a blood transfuion. Patient prefrs conservative management. Precautions given. Repeat H/H, Hct 23, stable. patient stable and discharged home today.

## 2018-11-05 NOTE — DISCHARGE NOTE OB - CARE PLAN
Principal Discharge DX:	 delivery delivered  Goal:	Recover from  delivery  Assessment and plan of treatment:	Make your follow-up appointment with your doctor as ordered 2 week after discharge. No heavy lifting, driving, or strenuous activity for 6 weeks. Nothing per vagina such as tampons, intercourse, douches or tub baths for 6 weeks or until you see your doctor. Call your doctor with any signs and symptoms of infection such as fever, chills, nausea, or vomiting. Call your doctor with redness or swelling at the incision site, fluid leakage, or wound separation. Call your doctor if you're unable to tolerate food, have an increase in vaginal bleeding, or have difficulty urinating. Call your doctor if you have pain that is not relieved by your prescribed medications. Notify your doctor with any other concerns.   Call [phone] if you have any of these concerns in the next 6 weeks.

## 2018-11-05 NOTE — CHART NOTE - NSCHARTNOTESELECT_GEN_ALL_CORE
After Visit Summary   4/9/2018    Selina Holloway    MRN: [id number]           Patient Information     Date Of Birth          2002        Visit Information        Provider Department      4/9/2018 3:30 PM Ivis Trujillo DC [facility]        Today's Diagnoses     Segmental and somatic dysfunction of thoracic region    -  1    Pain in thoracic spine        Segmental and somatic dysfunction of cervical region        Chronic tension-type headache, not intractable        Cervicalgia          Care Instructions      3/6/2018   Plan of Care:  4/9/18 Resume Recommendations for Chiropractic Care including Spinal Adjustments and/or physiotherapy and active rehabilitation, to include exercises in the office and/or at home to meet care plan goals.     Frequency: 2xweek for up to 2 weeks, 1xweek for 2 weeks. Reevaluate and reduce frequency or discharge is goals have been met.    POC discussed and patient agreeable to plan of care.      Initial 3/6/18 Neck index 60% Back index 38%   Goals:  To be met by Re-eval in 1 month   Patient will report improved aching back pain improving concentration at school.   Patient will report reduced frequency and intensity of daily headaches.   Patient will report reduced neck pain with reading.   Patient will demonstrate an improved ability to complete Activities of Daily Living as shown by a reported reduced score on neck and back index.    Patient will demonstrate improved ROM and motion on palpation testing.       INSTRUCTIONS   Apply ice to neck to reduce inflammation, heat to relax upper back muscles.  Gentle Range Of Motion stretching as shown: Neck  Instructions to try cross friction massage and lying on tennis balls at tight neck muscles at base of head shown.  Return  at 2x week for 2 weeks.             Follow-ups after your visit        Your next 10 appointments already scheduled     Apr 16, 2018  3:30 PM LUANN   MAGGIE Chiropractor with Ivis BAI  BRIGHT Trujillo   [facility]    [address] [facility] [address] [phone]            Apr 19, 2018  3:00 PM CDT   MAGGIE Chiropractor with Ivis Trujillo DC   [facility]    [address] [facility] [id number] [phone]              Who to contact     If you have questions or need follow up information about today's clinic visit or your schedule please contact [facility] directly at [phone].  Normal or non-critical lab and imaging results will be communicated to you by MyChart, letter or phone within 4 business days after the clinic has received the results. If you do not hear from us within 7 days, please contact the clinic through MyChart or phone. If you have a critical or abnormal lab result, we will notify you by phone as soon as possible.  Submit refill requests through [facility] or call your pharmacy and they will forward the refill request to us. Please allow 3 business days for your refill to be completed.          Additional Information About Your Visit        Care EveryWhere ID     This is your Care EveryWhere ID. This could be used by other organizations to access your [facility] medical records  Opted out of Care Everywhere exchange         Blood Pressure from Last 3 Encounters:   03/15/18 105/55   02/13/18 126/68   01/26/18 108/60    Weight from Last 3 Encounters:   03/15/18 54.3 kg (119 lb 12.8 oz) (46 %)*   02/13/18 54.6 kg (120 lb 6.4 oz) (48 %)*   01/26/18 54.4 kg (120 lb) (47 %)*     * Growth percentiles are based on CDC 2-20 Years data.              We Performed the Following     C CHIROPRAC MANIP,SPINAL,1-2 REGIONS - [facility] ONLY        Primary Care Provider Office Phone # Fax #    Kandice Moore [phone]       [address] [facility] [address]        Equal Access to Services     WHIT TOLENTINO :  Event Note Hadii chrissy pichardo Soguerreroali, waaxda luqadaha, qaybta kaalaaron escalera, salvatore gelyin hayaan arabellacl[facility] layanickmatt lizy. So [facility] [phone].    ATENCIÓN: Si ancala nirali, tiene a robbins disposición servicios gratuitos de asistencia lingüística. Llame al [phone].    We comply with applicable federal civil rights laws and [place] laws. We do not discriminate on the basis of race, color, national origin, age, disability, sex, sexual orientation, or gender identity.            Thank you!     Thank you for choosing [facility] AND [facility]  for your care. Our goal is always to provide you with excellent care. Hearing back from our patients is one way we can continue to improve our services. Please take a few minutes to complete the written survey that you may receive in the mail after your visit with us. Thank you!             Your Updated Medication List - Protect others around you: Learn how to safely use, store and throw away your medicines at www.disposemymeds.org.          This list is accurate as of 4/9/18 11:59 PM.  Always use your most recent med list.                   Brand Name Dispense Instructions for use Diagnosis    citalopram 20 MG tablet    celeXA    90 tablet    Take 1 tablet (20 mg) by mouth daily    HOLGER (generalized anxiety disorder)       Fluocinolone Acetonide Scalp 0.01 % Oil oil     118 mL    Apply topically At Bedtime    Rash       levonorgestrel-ethinyl estradiol 0.15-30 MG-MCG per tablet    NORDETTE    84 tablet    Take 1 tablet by mouth daily    Dysmenorrhea       ranitidine 75 MG tablet    ZANTAC    30 tablet    Take 1 tablet (75 mg) by mouth daily (with breakfast)    Gastroesophageal reflux disease without esophagitis

## 2018-11-05 NOTE — CONSULT NOTE ADULT - SUBJECTIVE AND OBJECTIVE BOX
CC: "My right ear is buzzing"    HPI: 32yo s/p  Section POD#3 doing well post-operatively, with c/o Buzzing in the right ear, with minimal clogged sensation. Pt denies any associated tinnitus/vertigo/visual changes/HA/N/V/hearing loss/nasal congestion/f/c.          PAST MEDICAL & SURGICAL HISTORY:    Allergies    eggplant (Urticaria)  No Known Drug Allergies    Intolerances      MEDICATIONS  (STANDING):  acetaminophen   Tablet .. 975 milliGRAM(s) Oral every 6 hours  ascorbic acid 500 milliGRAM(s) Oral daily  diphtheria/tetanus/pertussis (acellular) Vaccine (ADAcel) 0.5 milliLiter(s) IntraMuscular once  docusate sodium 100 milliGRAM(s) Oral three times a day  ferrous    sulfate 325 milliGRAM(s) Oral three times a day  heparin  Injectable 5000 Unit(s) SubCutaneous every 12 hours  ibuprofen  Tablet. 600 milliGRAM(s) Oral every 6 hours  lactated ringers. 1000 milliLiter(s) (125 mL/Hr) IV Continuous <Continuous>  oxyCODONE    IR 5 milliGRAM(s) Oral every 3 hours  oxytocin Infusion 41.667 milliUNIT(s)/Min (125 mL/Hr) IV Continuous <Continuous>  oxytocin Infusion 333.333 milliUNIT(s)/Min (1000 mL/Hr) IV Continuous <Continuous>  oxytocin Infusion 2 milliUNIT(s)/Min (2 mL/Hr) IV Continuous <Continuous>  prenatal multivitamin 1 Tablet(s) Oral daily    MEDICATIONS  (PRN):  diphenhydrAMINE 25 milliGRAM(s) Oral every 6 hours PRN Itching  glycerin Suppository - Adult 1 Suppository(s) Rectal at bedtime PRN Constipation  lanolin Ointment 1 Application(s) Topical every 3 hours PRN Sore Nipples  oxyCODONE    IR 5 milliGRAM(s) Oral every 4 hours PRN Severe Pain (7 - 10)  simethicone 80 milliGRAM(s) Chew every 4 hours PRN Gas      Social History: denies etoh/tobacco/substance use    Family history: non contributory to current condition    ROS:   ENT: all negative except as noted in HPI   CV: denies palpitations  Pulm: denies SOB, cough, hemoptysis  GI: denies change in apetite, indigestion, n/v  : denies pertinent urinary symptoms, urgency  Neuro: denies numbness/tingling, loss of sensation  Psych: denies anxiety  MS: denies muscle weakness, instability  Heme: denies easy bruising or bleeding  Endo: denies heat/cold intolerance, excessive sweating  Vascular: denies LE edema    Vital Signs Last 24 Hrs  T(C): 37 (2018 09:00), Max: 37 (2018 09:00)  T(F): 98.6 (2018 09:00), Max: 98.6 (2018 09:00)  HR: 88 (2018 09:00) (80 - 92)  BP: 116/75 (2018 09:00) (108/73 - 118/77)  BP(mean): --  RR: 18 (2018 09:00) (16 - 18)  SpO2: 98% (2018 09:00) (94% - 98%)                          7.9    x     )-----------( x        ( 2018 10:59 )             23.7             PHYSICAL EXAM:  Gen: NAD  Skin: No rashes, bruises, or lesions  Head: Normocephalic, Atraumatic  Face: no edema, erythema, or fluctuance. Parotid glands soft without mass  Eyes: no scleral injection  Ears: Right - ear canal clear, TM intact without effusion or erythema. No evidence of any fluid drainage. No mastoid tenderness, erythema, or ear bulging            Left - ear canal clear, TM intact without effusion or erythema. No evidence of any fluid drainage. No mastoid tenderness, erythema, or ear bulging  Nose: Nares bilaterally patent, no discharge  Mouth: No Stridor / Drooling / Trismus.  Mucosa moist, tongue/uvula midline, oropharynx clear  Neck: Flat, supple, no lymphadenopathy, trachea midline, no masses  Lymphatic: No lymphadenopathy  Resp: breathing easily, no stridor  CV: no peripheral edema/cyanosis  GI: nondistended   Peripheral vascular: no JVD or edema  Neuro: facial nerve intact, no facial droop        Diagnostic Nasal Endoscopy: (Scope #2 used)    Fiberoptic Indirect laryngoscopy:  (Scope #2 used)        IMAGING/ADDITIONAL STUDIES: CC: "My right ear is buzzing"    HPI: 32yo s/p  Section POD#3 doing well post-operatively, with c/o Buzzing in the right ear, with minimal clogged sensation. Pt denies any associated tinnitus/vertigo/visual changes/HA/N/V/hearing loss/nasal congestion/f/c.          PAST MEDICAL & SURGICAL HISTORY:    Allergies    eggplant (Urticaria)  No Known Drug Allergies    Intolerances      MEDICATIONS  (STANDING):  acetaminophen   Tablet .. 975 milliGRAM(s) Oral every 6 hours  ascorbic acid 500 milliGRAM(s) Oral daily  diphtheria/tetanus/pertussis (acellular) Vaccine (ADAcel) 0.5 milliLiter(s) IntraMuscular once  docusate sodium 100 milliGRAM(s) Oral three times a day  ferrous    sulfate 325 milliGRAM(s) Oral three times a day  heparin  Injectable 5000 Unit(s) SubCutaneous every 12 hours  ibuprofen  Tablet. 600 milliGRAM(s) Oral every 6 hours  lactated ringers. 1000 milliLiter(s) (125 mL/Hr) IV Continuous <Continuous>  oxyCODONE    IR 5 milliGRAM(s) Oral every 3 hours  oxytocin Infusion 41.667 milliUNIT(s)/Min (125 mL/Hr) IV Continuous <Continuous>  oxytocin Infusion 333.333 milliUNIT(s)/Min (1000 mL/Hr) IV Continuous <Continuous>  oxytocin Infusion 2 milliUNIT(s)/Min (2 mL/Hr) IV Continuous <Continuous>  prenatal multivitamin 1 Tablet(s) Oral daily    MEDICATIONS  (PRN):  diphenhydrAMINE 25 milliGRAM(s) Oral every 6 hours PRN Itching  glycerin Suppository - Adult 1 Suppository(s) Rectal at bedtime PRN Constipation  lanolin Ointment 1 Application(s) Topical every 3 hours PRN Sore Nipples  oxyCODONE    IR 5 milliGRAM(s) Oral every 4 hours PRN Severe Pain (7 - 10)  simethicone 80 milliGRAM(s) Chew every 4 hours PRN Gas      Social History: denies etoh/tobacco/substance use    Family history: non contributory to current condition    ROS:   ENT: all negative except as noted in HPI   CV: denies palpitations  Pulm: denies SOB, cough, hemoptysis  GI: denies change in apetite, indigestion, n/v  : denies pertinent urinary symptoms, urgency  Neuro: denies numbness/tingling, loss of sensation  Psych: denies anxiety  MS: denies muscle weakness, instability  Heme: denies easy bruising or bleeding  Endo: denies heat/cold intolerance, excessive sweating  Vascular: denies LE edema    Vital Signs Last 24 Hrs  T(C): 37 (2018 09:00), Max: 37 (2018 09:00)  T(F): 98.6 (2018 09:00), Max: 98.6 (2018 09:00)  HR: 88 (2018 09:00) (80 - 92)  BP: 116/75 (2018 09:00) (108/73 - 118/77)  BP(mean): --  RR: 18 (2018 09:00) (16 - 18)  SpO2: 98% (2018 09:00) (94% - 98%)                          7.9    x     )-----------( x        ( 2018 10:59 )             23.7             PHYSICAL EXAM:  Gen: NAD  Skin: No rashes, bruises, or lesions  Head: Normocephalic, Atraumatic  Face: no edema, erythema, or fluctuance. Parotid glands soft without mass  Eyes: no scleral injection  Ears: Right - ear canal clear, TM intact without effusion or erythema. No evidence of any fluid drainage. No mastoid tenderness, erythema, or ear bulging            Left - ear canal clear, TM intact without effusion or erythema. No evidence of any fluid drainage. No mastoid tenderness, erythema, or ear bulging  Nose: Nares bilaterally patent, no discharge  Mouth: No Stridor / Drooling / Trismus.  Mucosa moist, tongue/uvula midline, oropharynx clear  Neck: Flat, supple, no lymphadenopathy, trachea midline, no masses  Lymphatic: No lymphadenopathy  Resp: breathing easily, no stridor  CV: no peripheral edema/cyanosis  GI: nondistended   Peripheral vascular: no JVD or edema  Neuro: facial nerve intact, no facial droop

## 2018-11-05 NOTE — DISCHARGE NOTE OB - PATIENT PORTAL LINK FT
You can access the [facility], offered by [facility], by registering with the following website: [email or web address]

## 2018-11-05 NOTE — DISCHARGE NOTE OB - INSTRUCTIONS
any increase in temp or bleeding call MD martin go to Emergency Room [facility] fact sheet given and explained

## 2018-11-05 NOTE — PROGRESS NOTE ADULT - PROBLEM SELECTOR PLAN 1
- Continue motrin, tylenol, oxycodone PRN for pain control.  - Increase ambulation  - Continue regular diet  - Discharge planning  -f/u ENT consult for tinnitus  Lyndsey Mayo PGY-1

## 2018-11-05 NOTE — PROGRESS NOTE ADULT - SUBJECTIVE AND OBJECTIVE BOX
OB Postpartum Note: Primary  Delivery, POD#3    S: 34yo  POD#3 s/p pLTCS. The patient feels well.  She is still complaining of ringing in her ears. Pain is well controlled. She is tolerating a regular diet and passing flatus. She is voiding spontaneously, and ambulating without difficulty. Denies CP/SOB. Denies lightheadedness/dizziness. Denies N/V.    O:  Vitals:  Vital Signs Last 24 Hrs  T(C): 36.9 (2018 06:20), Max: 36.9 (2018 08:45)  T(F): 98.4 (2018 06:20), Max: 98.4 (2018 08:45)  HR: 80 (2018 06:20) (80 - 100)  BP: 108/73 (2018 06:20) (108/73 - 118/77)  BP(mean): --  RR: 18 (2018 06:20) (16 - 18)  SpO2: 94% (2018 06:20) (94% - 95%)    MEDICATIONS  (STANDING):  acetaminophen   Tablet .. 975 milliGRAM(s) Oral every 6 hours  ascorbic acid 500 milliGRAM(s) Oral daily  diphtheria/tetanus/pertussis (acellular) Vaccine (ADAcel) 0.5 milliLiter(s) IntraMuscular once  docusate sodium 100 milliGRAM(s) Oral three times a day  ferrous    sulfate 325 milliGRAM(s) Oral three times a day  heparin  Injectable 5000 Unit(s) SubCutaneous every 12 hours  ibuprofen  Tablet. 600 milliGRAM(s) Oral every 6 hours  lactated ringers. 1000 milliLiter(s) (125 mL/Hr) IV Continuous <Continuous>  oxyCODONE    IR 5 milliGRAM(s) Oral every 3 hours  oxytocin Infusion 41.667 milliUNIT(s)/Min (125 mL/Hr) IV Continuous <Continuous>  oxytocin Infusion 333.333 milliUNIT(s)/Min (1000 mL/Hr) IV Continuous <Continuous>  oxytocin Infusion 2 milliUNIT(s)/Min (2 mL/Hr) IV Continuous <Continuous>  prenatal multivitamin 1 Tablet(s) Oral daily    MEDICATIONS  (PRN):  diphenhydrAMINE 25 milliGRAM(s) Oral every 6 hours PRN Itching  glycerin Suppository - Adult 1 Suppository(s) Rectal at bedtime PRN Constipation  lanolin Ointment 1 Application(s) Topical every 3 hours PRN Sore Nipples  oxyCODONE    IR 5 milliGRAM(s) Oral every 4 hours PRN Severe Pain (7 - 10)  simethicone 80 milliGRAM(s) Chew every 4 hours PRN Gas      LABS:  Blood type: A Positive  Rubella IgG: Positive ( @ 03:32)  RPR: Negative                          8.2<L>   12.20<H> >-----------< 138<L>    (  @ 08:35 )             25.0<L>            Physical exam:  Gen: NAD  Abdomen: Soft, nontender, no distension , firm uterine fundus at umbilicus.  Incision: Clean, dry, and intact   Pelvic: Normal lochia noted  Ext: No calf tenderness

## 2018-11-05 NOTE — CHART NOTE - NSCHARTNOTEFT_GEN_A_CORE
PA NOTE      POD#3    Vital Signs Last 24 Hrs  T(C): 36.9 (2018 06:20), Max: 36.9 (2018 21:50)  T(F): 98.4 (2018 06:20), Max: 98.4 (2018 21:50)  HR: 80 (2018 06:20) (80 - 92)  BP: 108/73 (2018 06:20) (108/73 - 118/77)  BP(mean): --  RR: 18 (2018 06:20) (16 - 18)  SpO2: 94% (2018 06:20) (94% - 95%)                          7.4    9.59  )-----------( 194      ( 2018 07:59 )             22.9     7.4/22.9      Plan:  - Ferrous Sulfate, Colace, Vitamin C supplementation.  - Repeat H&H   - Monitor for signs/symptoms of anemia.

## 2018-11-09 LAB — SURGICAL PATHOLOGY STUDY: SIGNIFICANT CHANGE UP

## 2018-11-20 ENCOUNTER — LABORATORY RESULT (OUTPATIENT)
Age: 33
End: 2018-11-20

## 2018-11-20 ENCOUNTER — APPOINTMENT (OUTPATIENT)
Dept: OBGYN | Facility: CLINIC | Age: 33
End: 2018-11-20
Payer: MEDICAID

## 2018-11-20 ENCOUNTER — OUTPATIENT (OUTPATIENT)
Dept: OUTPATIENT SERVICES | Facility: HOSPITAL | Age: 33
LOS: 1 days | End: 2018-11-20
Payer: MEDICAID

## 2018-11-20 VITALS
WEIGHT: 150 LBS | HEIGHT: 65 IN | DIASTOLIC BLOOD PRESSURE: 70 MMHG | BODY MASS INDEX: 24.99 KG/M2 | SYSTOLIC BLOOD PRESSURE: 110 MMHG

## 2018-11-20 DIAGNOSIS — D62 ACUTE POSTHEMORRHAGIC ANEMIA: ICD-10-CM

## 2018-11-20 PROCEDURE — 99213 OFFICE O/P EST LOW 20 MIN: CPT | Mod: NC

## 2018-11-20 PROCEDURE — G0463: CPT

## 2018-11-20 PROCEDURE — 81003 URINALYSIS AUTO W/O SCOPE: CPT | Mod: NC,QW

## 2018-11-20 PROCEDURE — 81003 URINALYSIS AUTO W/O SCOPE: CPT

## 2018-11-20 PROCEDURE — 85027 COMPLETE CBC AUTOMATED: CPT

## 2018-11-20 PROCEDURE — 36415 COLL VENOUS BLD VENIPUNCTURE: CPT | Mod: NC

## 2018-11-21 DIAGNOSIS — D62 ACUTE POSTHEMORRHAGIC ANEMIA: ICD-10-CM

## 2018-11-21 LAB
HCT VFR BLD CALC: 39.2 % — SIGNIFICANT CHANGE UP (ref 34.5–45)
HGB BLD-MCNC: 11.7 G/DL — SIGNIFICANT CHANGE UP (ref 11.5–15.5)
MCHC RBC-ENTMCNC: 26.2 PG — LOW (ref 27–34)
MCHC RBC-ENTMCNC: 29.8 GM/DL — LOW (ref 32–36)
MCV RBC AUTO: 87.7 FL — SIGNIFICANT CHANGE UP (ref 80–100)
PLATELET # BLD AUTO: 559 K/UL — HIGH (ref 150–400)
RBC # BLD: 4.47 M/UL — SIGNIFICANT CHANGE UP (ref 3.8–5.2)
RBC # FLD: 15.7 % — HIGH (ref 10.3–14.5)
WBC # BLD: 10.25 K/UL — SIGNIFICANT CHANGE UP (ref 3.8–10.5)
WBC # FLD AUTO: 10.25 K/UL — SIGNIFICANT CHANGE UP (ref 3.8–10.5)

## 2018-12-19 ENCOUNTER — APPOINTMENT (OUTPATIENT)
Dept: OBGYN | Facility: CLINIC | Age: 33
End: 2018-12-19

## 2018-12-28 ENCOUNTER — APPOINTMENT (OUTPATIENT)
Dept: RADIOLOGY | Facility: CLINIC | Age: 33
End: 2018-12-28
Payer: MEDICAID

## 2018-12-28 ENCOUNTER — OUTPATIENT (OUTPATIENT)
Dept: OUTPATIENT SERVICES | Facility: HOSPITAL | Age: 33
LOS: 1 days | End: 2018-12-28
Payer: MEDICAID

## 2018-12-28 ENCOUNTER — APPOINTMENT (OUTPATIENT)
Dept: MRI IMAGING | Facility: CLINIC | Age: 33
End: 2018-12-28
Payer: MEDICAID

## 2018-12-28 DIAGNOSIS — Z00.8 ENCOUNTER FOR OTHER GENERAL EXAMINATION: ICD-10-CM

## 2018-12-28 PROCEDURE — 73030 X-RAY EXAM OF SHOULDER: CPT

## 2018-12-28 PROCEDURE — 73600 X-RAY EXAM OF ANKLE: CPT

## 2018-12-28 PROCEDURE — 73030 X-RAY EXAM OF SHOULDER: CPT | Mod: 26,LT

## 2018-12-28 PROCEDURE — 73600 X-RAY EXAM OF ANKLE: CPT | Mod: 26,LT

## 2019-01-02 ENCOUNTER — OUTPATIENT (OUTPATIENT)
Dept: OUTPATIENT SERVICES | Facility: HOSPITAL | Age: 34
LOS: 1 days | End: 2019-01-02
Payer: MEDICAID

## 2019-01-02 ENCOUNTER — APPOINTMENT (OUTPATIENT)
Dept: OBGYN | Facility: CLINIC | Age: 34
End: 2019-01-02
Payer: MEDICAID

## 2019-01-02 VITALS — BODY MASS INDEX: 24.3 KG/M2 | DIASTOLIC BLOOD PRESSURE: 70 MMHG | WEIGHT: 146 LBS | SYSTOLIC BLOOD PRESSURE: 108 MMHG

## 2019-01-02 DIAGNOSIS — N76.0 ACUTE VAGINITIS: ICD-10-CM

## 2019-01-02 PROCEDURE — G0463: CPT

## 2019-01-02 PROCEDURE — 99213 OFFICE O/P EST LOW 20 MIN: CPT | Mod: GE

## 2019-01-04 ENCOUNTER — APPOINTMENT (OUTPATIENT)
Dept: ORTHOPEDIC SURGERY | Facility: CLINIC | Age: 34
End: 2019-01-04
Payer: MEDICAID

## 2019-01-04 VITALS
SYSTOLIC BLOOD PRESSURE: 121 MMHG | HEART RATE: 79 BPM | DIASTOLIC BLOOD PRESSURE: 80 MMHG | WEIGHT: 145 LBS | BODY MASS INDEX: 24.16 KG/M2 | HEIGHT: 65 IN

## 2019-01-04 PROCEDURE — 99204 OFFICE O/P NEW MOD 45 MIN: CPT

## 2019-01-09 ENCOUNTER — FORM ENCOUNTER (OUTPATIENT)
Age: 34
End: 2019-01-09

## 2019-01-10 ENCOUNTER — APPOINTMENT (OUTPATIENT)
Dept: MRI IMAGING | Facility: CLINIC | Age: 34
End: 2019-01-10
Payer: MEDICAID

## 2019-01-10 ENCOUNTER — OUTPATIENT (OUTPATIENT)
Dept: OUTPATIENT SERVICES | Facility: HOSPITAL | Age: 34
LOS: 1 days | End: 2019-01-10
Payer: MEDICAID

## 2019-01-10 DIAGNOSIS — M25.512 PAIN IN LEFT SHOULDER: ICD-10-CM

## 2019-01-10 PROCEDURE — 73221 MRI JOINT UPR EXTREM W/O DYE: CPT

## 2019-01-10 PROCEDURE — 73221 MRI JOINT UPR EXTREM W/O DYE: CPT | Mod: 26,LT

## 2019-01-15 ENCOUNTER — APPOINTMENT (OUTPATIENT)
Dept: ORTHOPEDIC SURGERY | Facility: CLINIC | Age: 34
End: 2019-01-15

## 2019-01-22 ENCOUNTER — APPOINTMENT (OUTPATIENT)
Dept: ORTHOPEDIC SURGERY | Facility: CLINIC | Age: 34
End: 2019-01-22
Payer: MEDICAID

## 2019-01-22 ENCOUNTER — APPOINTMENT (OUTPATIENT)
Dept: OTOLARYNGOLOGY | Facility: CLINIC | Age: 34
End: 2019-01-22
Payer: MEDICAID

## 2019-01-22 VITALS
HEART RATE: 83 BPM | DIASTOLIC BLOOD PRESSURE: 79 MMHG | HEIGHT: 65 IN | WEIGHT: 145 LBS | BODY MASS INDEX: 24.16 KG/M2 | SYSTOLIC BLOOD PRESSURE: 134 MMHG

## 2019-01-22 DIAGNOSIS — H61.21 IMPACTED CERUMEN, RIGHT EAR: ICD-10-CM

## 2019-01-22 DIAGNOSIS — J34.2 DEVIATED NASAL SEPTUM: ICD-10-CM

## 2019-01-22 DIAGNOSIS — M25.512 PAIN IN LEFT SHOULDER: ICD-10-CM

## 2019-01-22 PROCEDURE — 99213 OFFICE O/P EST LOW 20 MIN: CPT

## 2019-01-22 PROCEDURE — 69210 REMOVE IMPACTED EAR WAX UNI: CPT

## 2019-01-22 PROCEDURE — 99214 OFFICE O/P EST MOD 30 MIN: CPT | Mod: 25

## 2019-01-22 PROCEDURE — 31231 NASAL ENDOSCOPY DX: CPT

## 2019-01-22 RX ORDER — NAPROXEN 500 MG/1
TABLET ORAL
Refills: 0 | Status: DISCONTINUED | COMMUNITY
End: 2019-01-22

## 2019-01-22 RX ORDER — DICLOFENAC SODIUM 75 MG/1
75 TABLET, DELAYED RELEASE ORAL
Qty: 60 | Refills: 0 | Status: DISCONTINUED | COMMUNITY
Start: 2019-01-04 | End: 2019-01-22

## 2019-01-22 NOTE — PHYSICAL EXAM
[Normal] : Gait: normal [Rad] : radial 2+ and symmetric bilaterally [Poor Appearance] : well-appearing [Acute Distress] : not in acute distress [Obese] : not obese [de-identified] : The patient has no respiratory distress. Mood and affect are normal. The patient is alert and oriented to person, place and time.\par Examination of the cervical spine demonstrates no tenderness, no deformity and no muscle spasm. Cervical spine rotation is 60° to the right, 60° to the left, 75° of extension and 45° of flexion. Neurologic exam of the upper extremities reveals intact sensation to light touch. Motor function is 5 over 5 in all groups. Deep tendon reflexes are 2+ and equal at the biceps, triceps and brachioradialis.\par There is no deformity of either shoulder. It is anterior tenderness of the left shoulder. There is pain with range of motion. Impingement is positive. The shoulder is stable. Empty can test is positive for pain. There is no weakness with rotation. The elbow was stable. The skin is intact. There is no lymphedema. [de-identified] : EXAM: MR SHOULDER LT \par \par \par PROCEDURE DATE: 01/10/2019 \par \par \par \par INTERPRETATION: EXAMINATION: MRI of the left shoulder \par \par HISTORY: Left shoulder pain. Limited range of motion. \par \par TECHNIQUE: Multiplanar, multisequential MR imaging was performed. \par \par FINDINGS: \par \par Rotator cuff: The rotator cuff tendons are intact without discrete tear. \par There is no rotator cuff muscle atrophy. \par \par Bursa: There is trace fluid in the subacromial subdeltoid bursa. \par \par Biceps: The long head of the biceps tendon is intact. There is no biceps \par subluxation. \par \par Glenohumeral joint and Labrum: There is physiologic amount of glenohumeral \par joint fluid. There are no fluid filled labral tears. Articular cartilage of \par the glenohumeral joint is preserved. There is mild glenohumeral capsular \par thickening and increased signal, most pronounced involving the inferior \par glenohumeral ligament. These are findings that can be seen in the setting of \par glenohumeral adhesive capsulitis. \par \par Acromioclavicular joint: The acromioclavicular joint is intact. \par \par Bones: There is prominent enthesopathic fibrocystic change and marrow edema \par in the posterior aspect of the greater tuberosity adjacent to the \par infraspinatus tendon insertion. \par \par IMPRESSION: Glenohumeral capsular thickening and signal alteration, \par suggesting glenohumeral adhesive capsulitis. Correlate clinically. \par \par Prominent enthesopathic fibrocystic change and edema in the posterior aspect \par of the greater tuberosity adjacent to the infraspinatus tendon insertion. \par \par No high-grade partial thickness or full-thickness rotator cuff tendon tear. \par \par \par \par IRASEMA DELGADILLO M.D., ATTENDING RADIOLOGIST \par This document has been electronically signed. Jan 11 2019 1:55PM

## 2019-01-22 NOTE — ASSESSMENT
[FreeTextEntry1] : pt with nasal congestion with BITH and possible Collin - not severely collapsed \par small bump on right nose likely cartilage vs scar from previous rhino \par BITH, does not think has allergies but will consider test to prevent recurrence\par feels breathing corrected with decongestion so would like to try turbinate reduction first, if persist can consider latera or other addressing of the collapse \par flonase\par NSS\par Risks benefits and alternatives to bilateral inferior turbinate reduction discussed. risks of bleeding, infection,empty nose syndrome as well as continued nasal obstruction were discussed. Patient understood risks and would like to continue with the operation.\par will do allergy test when gets procedure\par \par \par tinnitus right with CI cleared - will see if resolves sx, if persists will consider audio/ further workup\par patient with tone tinnitus - discussed pathophysiology of tinnitus and usual prognosis and treatment. will try otovits and masking techniques. If persist and bothersome can try pitch therapy, or acupuncture\par

## 2019-01-22 NOTE — HISTORY OF PRESENT ILLNESS
[de-identified] : getting right ear for 1-2 year\par no hearing loss\par no Vertigo, pain, drainage or facial weakness.\par \par \par nasal bump right dorsum slightly tender\par feels may have gotten bumped\par \par Hx rhinoplasty, left side still congested\par no sinus pressure or pain \par no runny nose\par has tried flonase for prolonged periods but did not help

## 2019-01-22 NOTE — PROCEDURE
[FreeTextEntry3] : Procedure- removal of cerumen right \par Diagnosis - right cerumen impaction\par Right ear found to have impacted cerumen - it was cleared with suction and curette, canal appeared normal.\par  [FreeTextEntry6] : Procedure performed: Nasal Endoscopy- Diagnostic\par Pre-op indication(s): nasal congestion\par Post-op indication(s): nasal congestion \par Verbal and/or written consent obtained from patient\par Anterior rhinoscopy insufficient to account for symptoms\par Scope #: 3,  flexible fiber optic telescope \par The scope was introduced in the nasal passage between the middle and inferior turbinates to exam the inferior portion of the middle meatus and the fontanelle, as well as the maxillary ostia.  Next, the scope was passed medically and posteriorly to the middle turbinates to examine the sphenoethmoid recess and the superior turbinate region.\par Upon visualization the finders are as follows:\par Nasal Septum: mild right septal deviation\par Bilateral - Mucosa: boggy turbinates, Mucous: scant, Polyp: not seen, Inferior Turbinate: boggy, Middle Turbinate: normal, Superior Turbinate: normal, Inferior Meatus: narrow, Middle Meatus: narrow, Super Meatus:normal, Sphenoethmoidal Recess: clear\par

## 2019-01-22 NOTE — PHYSICAL EXAM
[Midline] : trachea located in midline position [Normal] : no rashes [de-identified] : ELVA cisneros [de-identified] : + gómez decent support, has small bump right dorum

## 2019-01-22 NOTE — CONSULT LETTER
[FreeTextEntry1] : Dear Dr. ZI UREÑA \par I had the pleasure of evaluating your patient ALBAN BARRON, thank you for allowing us to participate in their care. please see full note detailing our visit below.\par If you have any questions, please do not hesitate to call me and I would be happy to discuss further. \par \par Mike Spencer M.D.\par Attending Physician,  \par Department of Otolaryngology - Head and Neck Surgery\par [facility] \par Office: [phone]\par Fax: [phone]\par \par

## 2019-01-22 NOTE — DISCUSSION/SUMMARY
[de-identified] : The patient has continued pain in her left shoulder secondary to tendinitis. She has adhesive capsulitis. The treatment options were discussed. She is not tolerating diclofenac. She will try Celebrex instead which she has tolerated in the past. I've offered her a steroid injection but she is unsure she would like this today. She should speak with her pediatrician regarding taking these medications while breast-feeding.

## 2019-01-22 NOTE — HISTORY OF PRESENT ILLNESS
[de-identified] : The patient presents for reevaluation of her left shoulder. She states her symptoms have worsened. She has pain when reaching to cover herself with a blanket. She reports limited motion. She is taking diclofenac once daily. She states the medication makes her sleepy and does not help with pain.

## 2019-02-04 ENCOUNTER — APPOINTMENT (OUTPATIENT)
Dept: OTOLARYNGOLOGY | Facility: CLINIC | Age: 34
End: 2019-02-04
Payer: MEDICAID

## 2019-02-04 VITALS
HEIGHT: 65 IN | SYSTOLIC BLOOD PRESSURE: 111 MMHG | DIASTOLIC BLOOD PRESSURE: 61 MMHG | HEART RATE: 75 BPM | WEIGHT: 145 LBS | BODY MASS INDEX: 24.16 KG/M2

## 2019-02-04 DIAGNOSIS — R09.81 NASAL CONGESTION: ICD-10-CM

## 2019-02-04 DIAGNOSIS — H93.11 TINNITUS, RIGHT EAR: ICD-10-CM

## 2019-02-04 DIAGNOSIS — J34.3 HYPERTROPHY OF NASAL TURBINATES: ICD-10-CM

## 2019-02-04 PROCEDURE — 30802 ABLATE INF TURBINATE SUBMUC: CPT

## 2019-02-04 PROCEDURE — 99213 OFFICE O/P EST LOW 20 MIN: CPT | Mod: 25

## 2019-02-04 NOTE — PROCEDURE
[FreeTextEntry3] : Risks benefits and alternatives to bilateral inferior turbinate reduction discussed. risks of bleeding, infection, empty nose syndrome, as well as continued nasal obstruction were discussed. Patient understood risks and would like to continue with the operation.\par Procedure performed: Nasal Endoscopy- Diagnostic, turbinate reduction \par Pre-op indication(s): nasal congestion, BITH \par Post-op indication(s): nasal congestion, BITH \par Verbal and/or written consent obtained from patient\par Anterior rhinoscopy insufficient to account for symptoms\par Scope #: R15, ridged zero degree optic telescope \par The scope was introduced in the nasal passage between the middle and inferior turbinates to exam the inferior portion of the middle meatus and the fontanelle, as well as the maxillary ostia.  Next, the scope was passed medically and posteriorly to the middle turbinates to examine the sphenoethmoid recess and the superior turbinate region.\par Upon visualization the finders are as follows:\par Nasal Septum: very near midline\par Bilateral - Mucosa: boggy turbinates, Mucous: scant, Polyp: not seen, Inferior Turbinate: boggy, Middle Turbinate: normal, Superior Turbinate: normal, Inferior Meatus: narrow, Middle Meatus: narrow, Super Meatus:normal, Sphenoethmoidal Recess: clear\par nasal cavities decongested with topical afrin patties followed by injection with Lidocaine with Epi NDC# 84873-880-59 into the inferior turbinates b/l approximally 5 cc total \par after some time a bipolar cautery introduced submucosally at multiple spots and on a setting of 10 the submucosal tissue was cauterized bilaterally being carefully to preserve mucosal lining. \par after submucosal ablation turbinates outfractured with freer elevator bilaterally \par tolerated well, minimal bleeding \par \par

## 2019-02-04 NOTE — HISTORY OF PRESENT ILLNESS
[de-identified] : Chronic nasal congestion with b/l turbinate hypertrophy.  Here today for turbinate reduction. \par \par also with right sided unilateral tinnitus, had cerumen  cleared last visit as possible source but sx have persisted . feels worse with certain movements of the head\par no Vertigo, HL, pain, drainage or facial weakness.\par

## 2019-02-04 NOTE — CONSULT LETTER
[FreeTextEntry1] : Dear Dr. ZI UREÑA \par I had the pleasure of evaluating your patient ALBAN BARRON, thank you for allowing us to participate in their care. please see full note detailing our visit below.\par If you have any questions, please do not hesitate to call me and I would be happy to discuss further. \par \par Mike Spencer M.D.\par Attending Physician,  \par Department of Otolaryngology - Head and Neck Surgery\par [facility] \par Office: [phone]\par Fax: [phone]\par \par

## 2019-02-04 NOTE — ASSESSMENT
[FreeTextEntry1] : pt with nasal congestion with BITH and possible Cass - not severely collapsed \par small bump on right nose likely cartilage vs scar from previous rhino \par BITH, does not think has allergies but will consider test to prevent recurrence\par feels breathing corrected with decongestion so would like to try turbinate reduction first, if persist can consider latera or other addressing of the collapse \par flonase\par NSS\par Risks benefits and alternatives to bilateral inferior turbinate reduction discussed. risks of bleeding, infection,empty nose syndrome as well as continued nasal obstruction were discussed. Patient understood risks and would like to continue with the operation.\par Turbinate reduced today, will do allergy test  next visit\par \par \par tinnitus right  persistent, will get audio next visit and consider imaging\par patient with tone tinnitus - discussed pathophysiology of tinnitus and usual prognosis and treatment. will try otovits and masking techniques. If persist and bothersome can try pitch therapy, or acupuncture\par

## 2019-02-14 ENCOUNTER — APPOINTMENT (OUTPATIENT)
Dept: OTOLARYNGOLOGY | Facility: CLINIC | Age: 34
End: 2019-02-14

## 2019-02-21 ENCOUNTER — APPOINTMENT (OUTPATIENT)
Dept: DERMATOLOGY | Facility: CLINIC | Age: 34
End: 2019-02-21

## 2019-11-12 ENCOUNTER — LABORATORY RESULT (OUTPATIENT)
Age: 34
End: 2019-11-12

## 2019-11-13 ENCOUNTER — LABORATORY RESULT (OUTPATIENT)
Age: 34
End: 2019-11-13

## 2019-11-13 ENCOUNTER — OUTPATIENT (OUTPATIENT)
Dept: OUTPATIENT SERVICES | Facility: HOSPITAL | Age: 34
LOS: 1 days | End: 2019-11-13
Payer: MEDICAID

## 2019-11-13 ENCOUNTER — NON-APPOINTMENT (OUTPATIENT)
Age: 34
End: 2019-11-13

## 2019-11-13 ENCOUNTER — APPOINTMENT (OUTPATIENT)
Dept: OBGYN | Facility: CLINIC | Age: 34
End: 2019-11-13
Payer: MEDICAID

## 2019-11-13 ENCOUNTER — TRANSCRIPTION ENCOUNTER (OUTPATIENT)
Age: 34
End: 2019-11-13

## 2019-11-13 ENCOUNTER — APPOINTMENT (OUTPATIENT)
Dept: ANTEPARTUM | Facility: CLINIC | Age: 34
End: 2019-11-13
Payer: MEDICAID

## 2019-11-13 ENCOUNTER — ASOB RESULT (OUTPATIENT)
Age: 34
End: 2019-11-13

## 2019-11-13 VITALS — BODY MASS INDEX: 23.13 KG/M2 | SYSTOLIC BLOOD PRESSURE: 120 MMHG | WEIGHT: 139 LBS | DIASTOLIC BLOOD PRESSURE: 68 MMHG

## 2019-11-13 DIAGNOSIS — Z34.80 ENCOUNTER FOR SUPERVISION OF OTHER NORMAL PREGNANCY, UNSPECIFIED TRIMESTER: ICD-10-CM

## 2019-11-13 DIAGNOSIS — Z34.90 ENCOUNTER FOR SUPERVISION OF NORMAL PREGNANCY, UNSPECIFIED, UNSPECIFIED TRIMESTER: ICD-10-CM

## 2019-11-13 DIAGNOSIS — Z34.92 ENCOUNTER FOR SUPERVISION OF NORMAL PREGNANCY, UNSPECIFIED, SECOND TRIMESTER: ICD-10-CM

## 2019-11-13 LAB
HCT VFR BLD CALC: 38.4 % — SIGNIFICANT CHANGE UP (ref 34.5–45)
HGB BLD-MCNC: 11.8 G/DL — SIGNIFICANT CHANGE UP (ref 11.5–15.5)
MCHC RBC-ENTMCNC: 27.4 PG — SIGNIFICANT CHANGE UP (ref 27–34)
MCHC RBC-ENTMCNC: 30.7 GM/DL — LOW (ref 32–36)
MCV RBC AUTO: 89.1 FL — SIGNIFICANT CHANGE UP (ref 80–100)
PLATELET # BLD AUTO: 243 K/UL — SIGNIFICANT CHANGE UP (ref 150–400)
RBC # BLD: 4.31 M/UL — SIGNIFICANT CHANGE UP (ref 3.8–5.2)
RBC # FLD: 14.6 % — HIGH (ref 10.3–14.5)
WBC # BLD: 10.77 K/UL — HIGH (ref 3.8–10.5)
WBC # FLD AUTO: 10.77 K/UL — HIGH (ref 3.8–10.5)

## 2019-11-13 PROCEDURE — 83655 ASSAY OF LEAD: CPT

## 2019-11-13 PROCEDURE — 87591 N.GONORRHOEAE DNA AMP PROB: CPT

## 2019-11-13 PROCEDURE — 81243 FMR1 GEN ALY DETC ABNL ALLEL: CPT

## 2019-11-13 PROCEDURE — 86765 RUBEOLA ANTIBODY: CPT

## 2019-11-13 PROCEDURE — 81220 CFTR GENE COM VARIANTS: CPT

## 2019-11-13 PROCEDURE — 81329 SMN1 GENE DOS/DELETION ALYS: CPT

## 2019-11-13 PROCEDURE — 76801 OB US < 14 WKS SINGLE FETUS: CPT

## 2019-11-13 PROCEDURE — 87389 HIV-1 AG W/HIV-1&-2 AB AG IA: CPT

## 2019-11-13 PROCEDURE — 86480 TB TEST CELL IMMUN MEASURE: CPT

## 2019-11-13 PROCEDURE — 87624 HPV HI-RISK TYP POOLED RSLT: CPT

## 2019-11-13 PROCEDURE — 87662 ZIKA VIRUS DNA/RNA AMP PROBE: CPT

## 2019-11-13 PROCEDURE — 99213 OFFICE O/P EST LOW 20 MIN: CPT | Mod: NC

## 2019-11-13 PROCEDURE — 87491 CHLMYD TRACH DNA AMP PROBE: CPT

## 2019-11-13 PROCEDURE — 86850 RBC ANTIBODY SCREEN: CPT

## 2019-11-13 PROCEDURE — 86762 RUBELLA ANTIBODY: CPT

## 2019-11-13 PROCEDURE — 86900 BLOOD TYPING SEROLOGIC ABO: CPT

## 2019-11-13 PROCEDURE — 36416 COLLJ CAPILLARY BLOOD SPEC: CPT

## 2019-11-13 PROCEDURE — 84443 ASSAY THYROID STIM HORMONE: CPT

## 2019-11-13 PROCEDURE — 87340 HEPATITIS B SURFACE AG IA: CPT

## 2019-11-13 PROCEDURE — 80053 COMPREHEN METABOLIC PANEL: CPT

## 2019-11-13 PROCEDURE — 76813 OB US NUCHAL MEAS 1 GEST: CPT

## 2019-11-13 PROCEDURE — 97802 MEDICAL NUTRITION INDIV IN: CPT | Mod: NC

## 2019-11-13 PROCEDURE — 87086 URINE CULTURE/COLONY COUNT: CPT

## 2019-11-13 PROCEDURE — 86780 TREPONEMA PALLIDUM: CPT

## 2019-11-13 PROCEDURE — 83020 HEMOGLOBIN ELECTROPHORESIS: CPT | Mod: 26

## 2019-11-13 PROCEDURE — 85027 COMPLETE CBC AUTOMATED: CPT

## 2019-11-13 PROCEDURE — 97802 MEDICAL NUTRITION INDIV IN: CPT

## 2019-11-13 PROCEDURE — 86794 ZIKA VIRUS IGM ANTIBODY: CPT

## 2019-11-13 PROCEDURE — 83036 HEMOGLOBIN GLYCOSYLATED A1C: CPT

## 2019-11-13 PROCEDURE — 83020 HEMOGLOBIN ELECTROPHORESIS: CPT

## 2019-11-14 LAB
ALBUMIN SERPL ELPH-MCNC: 4.7 G/DL — SIGNIFICANT CHANGE UP (ref 3.3–5)
ALP SERPL-CCNC: 54 U/L — SIGNIFICANT CHANGE UP (ref 40–120)
ALT FLD-CCNC: 9 U/L — LOW (ref 10–45)
ANION GAP SERPL CALC-SCNC: 16 MMOL/L — SIGNIFICANT CHANGE UP (ref 5–17)
AST SERPL-CCNC: 16 U/L — SIGNIFICANT CHANGE UP (ref 10–40)
BILIRUB SERPL-MCNC: 0.2 MG/DL — SIGNIFICANT CHANGE UP (ref 0.2–1.2)
BUN SERPL-MCNC: 10 MG/DL — SIGNIFICANT CHANGE UP (ref 7–23)
C TRACH RRNA SPEC QL NAA+PROBE: SIGNIFICANT CHANGE UP
CALCIUM SERPL-MCNC: 9.2 MG/DL — SIGNIFICANT CHANGE UP (ref 8.4–10.5)
CHLORIDE SERPL-SCNC: 102 MMOL/L — SIGNIFICANT CHANGE UP (ref 96–108)
CO2 SERPL-SCNC: 19 MMOL/L — LOW (ref 22–31)
CREAT SERPL-MCNC: 0.51 MG/DL — SIGNIFICANT CHANGE UP (ref 0.5–1.3)
ESTIMATED AVERAGE GLUCOSE: 108 MG/DL — SIGNIFICANT CHANGE UP (ref 68–114)
GLUCOSE SERPL-MCNC: 86 MG/DL — SIGNIFICANT CHANGE UP (ref 70–99)
HBA1C BLD-MCNC: 5.4 % — SIGNIFICANT CHANGE UP (ref 4–5.6)
HBV SURFACE AG SER-ACNC: SIGNIFICANT CHANGE UP
HIV 1+2 AB+HIV1 P24 AG SERPL QL IA: SIGNIFICANT CHANGE UP
LEAD BLD-MCNC: 1 UG/DL — SIGNIFICANT CHANGE UP (ref 0–4)
MEV IGG SER-ACNC: >300 AU/ML — SIGNIFICANT CHANGE UP
MEV IGG+IGM SER-IMP: POSITIVE — SIGNIFICANT CHANGE UP
N GONORRHOEA RRNA SPEC QL NAA+PROBE: SIGNIFICANT CHANGE UP
POTASSIUM SERPL-MCNC: 4.1 MMOL/L — SIGNIFICANT CHANGE UP (ref 3.5–5.3)
POTASSIUM SERPL-SCNC: 4.1 MMOL/L — SIGNIFICANT CHANGE UP (ref 3.5–5.3)
PROT SERPL-MCNC: 7.3 G/DL — SIGNIFICANT CHANGE UP (ref 6–8.3)
RUBV IGG SER-ACNC: 10.6 INDEX — SIGNIFICANT CHANGE UP
RUBV IGG SER-IMP: POSITIVE — SIGNIFICANT CHANGE UP
SODIUM SERPL-SCNC: 137 MMOL/L — SIGNIFICANT CHANGE UP (ref 135–145)
SPECIMEN SOURCE: SIGNIFICANT CHANGE UP
T PALLIDUM AB TITR SER: NEGATIVE — SIGNIFICANT CHANGE UP
TSH SERPL-MCNC: 1.03 UIU/ML — SIGNIFICANT CHANGE UP (ref 0.27–4.2)

## 2019-11-15 LAB
CULTURE RESULTS: SIGNIFICANT CHANGE UP
SPECIMEN SOURCE: SIGNIFICANT CHANGE UP
SPINAL MUSCULAR ATROPHY: NEGATIVE — SIGNIFICANT CHANGE UP
ZIKA VIRUS PCR SERUM: SIGNIFICANT CHANGE UP
ZIKA VIRUS PCR URINE: SIGNIFICANT CHANGE UP
ZIKV RNA SERPL QL NAA+PROBE: SIGNIFICANT CHANGE UP
ZIKV RNA UR QL NAA+PROBE: SIGNIFICANT CHANGE UP

## 2019-11-16 LAB
GAMMA INTERFERON BACKGROUND BLD IA-ACNC: 0.02 IU/ML — SIGNIFICANT CHANGE UP
M TB IFN-G BLD-IMP: NEGATIVE — SIGNIFICANT CHANGE UP
M TB IFN-G CD4+ BCKGRND COR BLD-ACNC: 0 IU/ML — SIGNIFICANT CHANGE UP
M TB IFN-G CD4+CD8+ BCKGRND COR BLD-ACNC: -0.01 IU/ML — SIGNIFICANT CHANGE UP
QUANT TB PLUS MITOGEN MINUS NIL: >10 IU/ML — SIGNIFICANT CHANGE UP

## 2019-11-17 LAB
HEMOGLOBIN INTERPRETATION: SIGNIFICANT CHANGE UP
HGB A MFR BLD: 97.5 % — SIGNIFICANT CHANGE UP (ref 95.8–98)
HGB A2 MFR BLD: 2.5 % — SIGNIFICANT CHANGE UP (ref 2–3.2)

## 2019-11-18 LAB — ZIKV IGM SERPL QL IA: NEGATIVE — SIGNIFICANT CHANGE UP

## 2019-11-19 DIAGNOSIS — Z34.90 ENCOUNTER FOR SUPERVISION OF NORMAL PREGNANCY, UNSPECIFIED, UNSPECIFIED TRIMESTER: ICD-10-CM

## 2019-11-20 LAB — FRAGILE X PROTEIN (FMRP) PNL BLD: SIGNIFICANT CHANGE UP

## 2019-11-22 LAB — CYSTIC FIBROSIS EXPANDED PANEL: SIGNIFICANT CHANGE UP

## 2020-04-07 ENCOUNTER — APPOINTMENT (OUTPATIENT)
Dept: OBGYN | Facility: CLINIC | Age: 35
End: 2020-04-07

## 2020-09-01 ENCOUNTER — APPOINTMENT (OUTPATIENT)
Dept: DERMATOLOGY | Facility: CLINIC | Age: 35
End: 2020-09-01
Payer: MEDICAID

## 2020-09-01 DIAGNOSIS — L81.1 CHLOASMA: ICD-10-CM

## 2020-09-01 DIAGNOSIS — D48.9 NEOPLASM OF UNCERTAIN BEHAVIOR, UNSPECIFIED: ICD-10-CM

## 2020-09-01 DIAGNOSIS — L98.8 OTHER SPECIFIED DISORDERS OF THE SKIN AND SUBCUTANEOUS TISSUE: ICD-10-CM

## 2020-09-01 PROCEDURE — 99203 OFFICE O/P NEW LOW 30 MIN: CPT | Mod: 95

## 2020-09-23 ENCOUNTER — EMERGENCY (EMERGENCY)
Facility: HOSPITAL | Age: 35
LOS: 1 days | Discharge: HOME | End: 2020-09-23
Attending: STUDENT IN AN ORGANIZED HEALTH CARE EDUCATION/TRAINING PROGRAM
Payer: MEDICAID

## 2020-09-23 VITALS
WEIGHT: 149.91 LBS | RESPIRATION RATE: 18 BRPM | SYSTOLIC BLOOD PRESSURE: 125 MMHG | HEIGHT: 64 IN | OXYGEN SATURATION: 100 % | TEMPERATURE: 99 F | HEART RATE: 84 BPM | DIASTOLIC BLOOD PRESSURE: 75 MMHG

## 2020-09-23 DIAGNOSIS — Z98.891 HISTORY OF UTERINE SCAR FROM PREVIOUS SURGERY: Chronic | ICD-10-CM

## 2020-09-23 LAB
ALBUMIN SERPL ELPH-MCNC: 5.8 G/DL — HIGH (ref 3.3–5)
ALP SERPL-CCNC: 73 U/L — SIGNIFICANT CHANGE UP (ref 40–120)
ALT FLD-CCNC: 13 U/L — SIGNIFICANT CHANGE UP (ref 10–45)
ANION GAP SERPL CALC-SCNC: 13 MMOL/L — SIGNIFICANT CHANGE UP (ref 5–17)
APPEARANCE UR: CLEAR — SIGNIFICANT CHANGE UP
AST SERPL-CCNC: 22 U/L — SIGNIFICANT CHANGE UP (ref 10–40)
BASOPHILS # BLD AUTO: 0.05 K/UL — SIGNIFICANT CHANGE UP (ref 0–0.2)
BASOPHILS NFR BLD AUTO: 0.5 % — SIGNIFICANT CHANGE UP (ref 0–2)
BILIRUB SERPL-MCNC: 0.3 MG/DL — SIGNIFICANT CHANGE UP (ref 0.2–1.2)
BILIRUB UR-MCNC: NEGATIVE — SIGNIFICANT CHANGE UP
BUN SERPL-MCNC: 14 MG/DL — SIGNIFICANT CHANGE UP (ref 7–23)
CALCIUM SERPL-MCNC: 10.2 MG/DL — SIGNIFICANT CHANGE UP (ref 8.4–10.5)
CHLORIDE SERPL-SCNC: 103 MMOL/L — SIGNIFICANT CHANGE UP (ref 96–108)
CO2 SERPL-SCNC: 28 MMOL/L — SIGNIFICANT CHANGE UP (ref 22–31)
COLOR SPEC: YELLOW — SIGNIFICANT CHANGE UP
CREAT SERPL-MCNC: 0.82 MG/DL — SIGNIFICANT CHANGE UP (ref 0.5–1.3)
DIFF PNL FLD: NEGATIVE — SIGNIFICANT CHANGE UP
EOSINOPHIL # BLD AUTO: 0.11 K/UL — SIGNIFICANT CHANGE UP (ref 0–0.5)
EOSINOPHIL NFR BLD AUTO: 1.1 % — SIGNIFICANT CHANGE UP (ref 0–6)
GLUCOSE SERPL-MCNC: 82 MG/DL — SIGNIFICANT CHANGE UP (ref 70–99)
GLUCOSE UR QL: NEGATIVE — SIGNIFICANT CHANGE UP
HCT VFR BLD CALC: 46.6 % — HIGH (ref 34.5–45)
HGB BLD-MCNC: 14.5 G/DL — SIGNIFICANT CHANGE UP (ref 11.5–15.5)
IMM GRANULOCYTES NFR BLD AUTO: 0.2 % — SIGNIFICANT CHANGE UP (ref 0–1.5)
KETONES UR-MCNC: NEGATIVE — SIGNIFICANT CHANGE UP
LEUKOCYTE ESTERASE UR-ACNC: NEGATIVE — SIGNIFICANT CHANGE UP
LIDOCAIN IGE QN: 21 U/L — SIGNIFICANT CHANGE UP (ref 7–60)
LYMPHOCYTES # BLD AUTO: 1.65 K/UL — SIGNIFICANT CHANGE UP (ref 1–3.3)
LYMPHOCYTES # BLD AUTO: 17.2 % — SIGNIFICANT CHANGE UP (ref 13–44)
MCHC RBC-ENTMCNC: 26 PG — LOW (ref 27–34)
MCHC RBC-ENTMCNC: 31.1 GM/DL — LOW (ref 32–36)
MCV RBC AUTO: 83.5 FL — SIGNIFICANT CHANGE UP (ref 80–100)
MONOCYTES # BLD AUTO: 0.44 K/UL — SIGNIFICANT CHANGE UP (ref 0–0.9)
MONOCYTES NFR BLD AUTO: 4.6 % — SIGNIFICANT CHANGE UP (ref 2–14)
NEUTROPHILS # BLD AUTO: 7.33 K/UL — SIGNIFICANT CHANGE UP (ref 1.8–7.4)
NEUTROPHILS NFR BLD AUTO: 76.4 % — SIGNIFICANT CHANGE UP (ref 43–77)
NITRITE UR-MCNC: NEGATIVE — SIGNIFICANT CHANGE UP
NRBC # BLD: 0 /100 WBCS — SIGNIFICANT CHANGE UP (ref 0–0)
PH UR: 7.5 — SIGNIFICANT CHANGE UP (ref 5–8)
PLATELET # BLD AUTO: 325 K/UL — SIGNIFICANT CHANGE UP (ref 150–400)
POTASSIUM SERPL-MCNC: 4.1 MMOL/L — SIGNIFICANT CHANGE UP (ref 3.5–5.3)
POTASSIUM SERPL-SCNC: 4.1 MMOL/L — SIGNIFICANT CHANGE UP (ref 3.5–5.3)
PROT SERPL-MCNC: 8.5 G/DL — HIGH (ref 6–8.3)
PROT UR-MCNC: SIGNIFICANT CHANGE UP
RBC # BLD: 5.58 M/UL — HIGH (ref 3.8–5.2)
RBC # FLD: 14 % — SIGNIFICANT CHANGE UP (ref 10.3–14.5)
SODIUM SERPL-SCNC: 144 MMOL/L — SIGNIFICANT CHANGE UP (ref 135–145)
SP GR SPEC: 1.02 — SIGNIFICANT CHANGE UP (ref 1.01–1.02)
UROBILINOGEN FLD QL: NEGATIVE — SIGNIFICANT CHANGE UP
WBC # BLD: 9.6 K/UL — SIGNIFICANT CHANGE UP (ref 3.8–10.5)
WBC # FLD AUTO: 9.6 K/UL — SIGNIFICANT CHANGE UP (ref 3.8–10.5)

## 2020-09-23 PROCEDURE — 74177 CT ABD & PELVIS W/CONTRAST: CPT | Mod: 26

## 2020-09-23 PROCEDURE — 99283 EMERGENCY DEPT VISIT LOW MDM: CPT | Mod: GC

## 2020-09-23 PROCEDURE — 93975 VASCULAR STUDY: CPT | Mod: 26

## 2020-09-23 PROCEDURE — 99218: CPT

## 2020-09-23 PROCEDURE — 76830 TRANSVAGINAL US NON-OB: CPT | Mod: 26

## 2020-09-23 PROCEDURE — 93010 ELECTROCARDIOGRAM REPORT: CPT

## 2020-09-23 RX ORDER — METRONIDAZOLE 500 MG
500 TABLET ORAL ONCE
Refills: 0 | Status: COMPLETED | OUTPATIENT
Start: 2020-09-23 | End: 2020-09-24

## 2020-09-23 RX ORDER — METRONIDAZOLE 500 MG
500 TABLET ORAL EVERY 8 HOURS
Refills: 0 | Status: DISCONTINUED | OUTPATIENT
Start: 2020-09-24 | End: 2020-09-24

## 2020-09-23 RX ORDER — SODIUM CHLORIDE 9 MG/ML
1000 INJECTION INTRAMUSCULAR; INTRAVENOUS; SUBCUTANEOUS ONCE
Refills: 0 | Status: COMPLETED | OUTPATIENT
Start: 2020-09-23 | End: 2020-09-23

## 2020-09-23 RX ORDER — ACETAMINOPHEN 500 MG
975 TABLET ORAL ONCE
Refills: 0 | Status: COMPLETED | OUTPATIENT
Start: 2020-09-23 | End: 2020-09-23

## 2020-09-23 RX ORDER — MORPHINE SULFATE 50 MG/1
4 CAPSULE, EXTENDED RELEASE ORAL ONCE
Refills: 0 | Status: DISCONTINUED | OUTPATIENT
Start: 2020-09-23 | End: 2020-09-23

## 2020-09-23 RX ORDER — ONDANSETRON 8 MG/1
4 TABLET, FILM COATED ORAL ONCE
Refills: 0 | Status: COMPLETED | OUTPATIENT
Start: 2020-09-23 | End: 2020-09-23

## 2020-09-23 RX ORDER — ONDANSETRON 8 MG/1
4 TABLET, FILM COATED ORAL EVERY 6 HOURS
Refills: 0 | Status: DISCONTINUED | OUTPATIENT
Start: 2020-09-23 | End: 2020-09-27

## 2020-09-23 RX ORDER — CIPROFLOXACIN LACTATE 400MG/40ML
400 VIAL (ML) INTRAVENOUS ONCE
Refills: 0 | Status: COMPLETED | OUTPATIENT
Start: 2020-09-23 | End: 2020-09-24

## 2020-09-23 RX ORDER — CIPROFLOXACIN LACTATE 400MG/40ML
400 VIAL (ML) INTRAVENOUS EVERY 12 HOURS
Refills: 0 | Status: DISCONTINUED | OUTPATIENT
Start: 2020-09-24 | End: 2020-09-24

## 2020-09-23 RX ORDER — METRONIDAZOLE 500 MG
TABLET ORAL
Refills: 0 | Status: DISCONTINUED | OUTPATIENT
Start: 2020-09-24 | End: 2020-09-24

## 2020-09-23 RX ORDER — FAMOTIDINE 10 MG/ML
20 INJECTION INTRAVENOUS DAILY
Refills: 0 | Status: DISCONTINUED | OUTPATIENT
Start: 2020-09-23 | End: 2020-09-27

## 2020-09-23 RX ORDER — CIPROFLOXACIN LACTATE 400MG/40ML
VIAL (ML) INTRAVENOUS
Refills: 0 | Status: DISCONTINUED | OUTPATIENT
Start: 2020-09-24 | End: 2020-09-24

## 2020-09-23 RX ADMIN — FAMOTIDINE 20 MILLIGRAM(S): 10 INJECTION INTRAVENOUS at 21:09

## 2020-09-23 RX ADMIN — Medication 975 MILLIGRAM(S): at 21:42

## 2020-09-23 RX ADMIN — ONDANSETRON 4 MILLIGRAM(S): 8 TABLET, FILM COATED ORAL at 14:21

## 2020-09-23 RX ADMIN — Medication 30 MILLILITER(S): at 21:10

## 2020-09-23 RX ADMIN — MORPHINE SULFATE 4 MILLIGRAM(S): 50 CAPSULE, EXTENDED RELEASE ORAL at 14:21

## 2020-09-23 RX ADMIN — SODIUM CHLORIDE 1000 MILLILITER(S): 9 INJECTION INTRAMUSCULAR; INTRAVENOUS; SUBCUTANEOUS at 14:24

## 2020-09-23 RX ADMIN — SODIUM CHLORIDE 1000 MILLILITER(S): 9 INJECTION INTRAMUSCULAR; INTRAVENOUS; SUBCUTANEOUS at 21:43

## 2020-09-23 NOTE — ED CDU PROVIDER INITIAL DAY NOTE - OBJECTIVE STATEMENT
35 year old fm with no pmhx presenting to the ED with ULQ and LRQ abd pain x 4 hours. patient states she had transvaginal delivery 4 months ago. patient states last week patient had a break out of psoriasis with joint pain. patient states she had bowel movement right before coming to ED. patient states this episode of psoriasis is diffuse throughout the whole body. patient denies any N/V/D, vag discharge, bleed, CP, weakness, SOB, HA, change in vision. patient taking hydrocortisone for psoriasis break out. patient denies any change in diet. Patient denies being seen by rheumatologist.

## 2020-09-23 NOTE — ED ADULT NURSE NOTE - CHPI ED NUR SYMPTOMS NEG
no burning urination/no vomiting/no dysuria/no hematuria/no blood in stool/no chills/no diarrhea/no abdominal distension/no fever

## 2020-09-23 NOTE — ED PROVIDER NOTE - OBJECTIVE STATEMENT
35 year old fm with no pmhx presenting to the ED with ULQ and LRQ abd pain x 4 hours. patient states she had transvaginal delivery 4 months ago. patient states last week patient had a break out of psoriasis with joint pain. patient states she had bowel movement 35 year old fm with no pmhx presenting to the ED with ULQ and LRQ abd pain x 4 hours. patient states she had transvaginal delivery 4 months ago. patient states last week patient had a break out of psoriasis with joint pain. patient states she had bowel movement right before coming to ED. patient states this episode of psoriasis is diffuse throughout the whole body. patient denies any N/V/D, vag discharge, bleed, CP, weakness, SOB, HA, change in vision, 35 year old fm with no pmhx presenting to the ED with ULQ and LRQ abd pain x 4 hours. patient states she had transvaginal delivery 4 months ago. patient states last week patient had a break out of psoriasis with joint pain. patient states she had bowel movement right before coming to ED. patient states this episode of psoriasis is diffuse throughout the whole body. patient denies any N/V/D, vag discharge, bleed, CP, weakness, SOB, HA, change in vision. patient taking hydrocortisone for psoriasis break out. patient denies any change in diet. Patient denies being seen by rheumatologist.

## 2020-09-23 NOTE — ED CDU PROVIDER DISPOSITION NOTE - ATTENDING CONTRIBUTION TO CARE
I have personally performed a face to face medical and diagnostic evaluation of the patient. I have discussed with and reviewed the ACP's note and agree with the History, ROS, Physical Exam and MDM unless otherwise indicated. A brief summary of my personal evaluation and impression can be found below.    I have personally performed a face to face medical and diagnostic evaluation of the patient. I have discussed with and reviewed the ACP's note and agree with the History, ROS, Physical Exam and MDM unless otherwise indicated. A brief summary of my personal evaluation and impression can be found below.    Note from previous day: 35 year old fm with no pmhx presenting to the ED with ULQ and LRQ abd pain x 4 hours. patient states she had transvaginal delivery 4 months ago. patient states last week patient had a break out of psoriasis with joint pain. patient states she had bowel movement right before coming to ED. patient states this episode of psoriasis is diffuse throughout the whole body. patient denies any N/V/D, vag discharge, bleed, CP, weakness, SOB, HA, change in vision. patient taking hydrocortisone for psoriasis break out. patient denies any change in diet. Patient denies being seen by rheumatologist.    No acute events overnight. Pain controlled. Pt feels well, still reports some abdominal discomfort. Otherwise without complaints.     Sarai MORENO:  VITALS: Initial triage and subsequent vitals have been reviewed by me.  GEN APPEARANCE: WDWN, alert, non-toxic, NAD  HEAD: Atraumatic.  EYES: PERRLa, EOMI, vision grossly intact.   NECK: Supple  CV: RRR, S1S2, no c/r/m/g. Cap refill <2 seconds. No bruits.   LUNGS: CTAB. No abnormal breath sounds.  ABDOMEN: mild diffuse abdominal ttp, worse L sided  MSK/EXT: No spinal or extremity point tenderness. No CVA ttp. Pelvis stable. No peripheral edema.  NEURO: Alert, follows commands. Weight bearing normal. Speech normal. Sensation and motor normal x4 extremities.   SKIN: Warm, dry and intact. No rash.  PSYCH: Appropriate    Plan/MDM: 35F breastfeeding found to have colitis on CT s/p ABX pending GI eval in a.m. Per GI at bedside pt stable for discharge pending PO challenge, PO meds. Colonoscopy/Sigmoidoscopy to be done outpt.  Likely dc thereafter w abx, GI follow up.

## 2020-09-23 NOTE — ED CDU PROVIDER DISPOSITION NOTE - NSFOLLOWUPINSTRUCTIONS_ED_ALL_ED_FT
- stay hydrated.   - take tylenol 975mg and ibuprofen 600mg every 6 hours as needed for pain-take with meals.  - follow up with your pcp in 1-2 days. If you do not have a PCP, follow up in the internal medicine clinic, call number attached  -Follow up with GI, call ______________.  - return if symptoms worsen, fever, inability to eat or drink due to pain or nausea/vomiting and all other concerns. 1. Stay hydrated. Rest. Moriarty diet.   2. Continue any Current Home Medications per routine.  3. Follow up with your PCP in 1-2 days. Follow up with Gastroenterology  [phone] to schedule appointment within 1-2 weeks. you will need outpatient colonoscopy.  Bring Printed Results.  4. Return if symptoms worsen, fever, weakness, inability to eat/drink, dizziness and all other concerns.

## 2020-09-23 NOTE — ED CDU PROVIDER DISPOSITION NOTE - PATIENT PORTAL LINK FT
You can access the FollowMyHealth Patient Portal offered by [facility] by registering at the following website: [email or web address]. By joining [facility]’s FollowMyHealth portal, you will also be able to view your health information using other applications (apps) compatible with our system.

## 2020-09-23 NOTE — ED PROVIDER NOTE - NS ED ROS FT
Review of Systems:  · Constitutional: no chills, no fever, no night sweats, no weight loss  · Nose: no epitaxies  · Mouth/Throat: no difficulty in swallowing, trachea midline, uvula midline  . Cardio: No CP, palpitations, chest pressure  · Respiratory: no cough, no exertional dyspnea, no hemoptysis, no orthopnea, no shortness of breath  · Gastrointestinal:  abdominal pain LUQ, Nausea, no diarrhea, no melena, no vomiting  · Genitourinary: no difficulty urinating, no dysuria, no hematuria  · MUSCULOSKELETAL: FROM of all extremities  · Skin: Break out of psoriasis, no abrasion; no bruising; no laceration  · Neurological: no change in level of consciousness, no headache, no seizures  · Psychiatric: no anxiety, no depression  · ROS STATEMENT: all other ROS negative except as per HPI

## 2020-09-23 NOTE — ED ADULT TRIAGE NOTE - CHIEF COMPLAINT QUOTE
severe cramps since this morning; nausea; states "this feels like contraction pain", 4 months postpartum

## 2020-09-23 NOTE — ED PROVIDER NOTE - CLINICAL SUMMARY MEDICAL DECISION MAKING FREE TEXT BOX
35F presenting with abd discomfort, psoriatic rash diffuse across body, denies hx of IBD [facility] no hx of colonoscopy, will check for infectious etiologies with labs / imaging, treat if etiology found, consider inflammatory etiology given systemic signs/symptoms, o/w relatively well appearing on exam with exception of rash / abd discomfort.

## 2020-09-23 NOTE — ED PROVIDER NOTE - PROGRESS NOTE DETAILS
Patient reassesed patient feeling much better. waiting on CT results. Radiology contacted for status of read. Herman Price PA-C Discussed CT results with patient. Discussed option. pt agreed to CDU and to be seen by GI in the morning. Herman Price PA-C PT ct +for colitis inflamatory v infections, Will tx abx, clear liquids overnight gi cs in am  Jimmy Alanis MD, Facep

## 2020-09-23 NOTE — ED CDU PROVIDER INITIAL DAY NOTE - ATTENDING CONTRIBUTION TO CARE
I have personally performed a face to face diagnostic evaluation on this patient.  I have reviewed the ACP note and agree with the history, exam, and plan of care, except as noted.  History and Exam by me shows  See Ed provider note  Jimmy Alanis MD, Facep

## 2020-09-23 NOTE — ED ADULT NURSE NOTE - OBJECTIVE STATEMENT
36 y/o female with pmhx of psoriasis c/o non radiating LUQ pain, which she describes as contraction feeling (10/10) associated with nausea x this morning.  pt denies any hematuria, melena, dark stools, fever, chills, vomiting, diarrhea or constipation at this time.  per pt, she delivered  x 4 months ago with no complications.  she also states that she is currently breast feeding with no complications.  pt is awake, alert and responsive to all stimuli.  no sob or respiratory distress noted.  vss.  skin is warm, dry and intact.  no pallor or cyanosis noted.  caps refill <2s.  pt medicated, per md's orders for pain and transported for ultrasound with safety precautions in place.  will continue to monitor upon her return.

## 2020-09-23 NOTE — ED CDU PROVIDER DISPOSITION NOTE - CLINICAL COURSE
35 year old fm with no pmhx presenting to the ED with ULQ and LRQ abd pain x 4 hours. patient states she had transvaginal delivery 4 months ago. patient states last week patient had a break out of psoriasis with joint pain. patient states she had bowel movement right before coming to ED. patient states this episode of psoriasis is diffuse throughout the whole body. patient denies any N/V/D, vag discharge, bleed, CP, weakness, SOB, HA, change in vision. patient taking hydrocortisone for psoriasis break out. patient denies any change in diet. Patient denies being seen by rheumatologist.    In ER labs unremarkable, TVUS unremarkable, CT scan significant for colitis, pt sent to CDU for pain control, serial abd exams, IV abx and GI consult     In CDU________ 35 year old fm with no pmhx presenting to the ED with ULQ and LRQ abd pain x 4 hours. patient states she had transvaginal delivery 4 months ago. patient states last week patient had a break out of psoriasis with joint pain. patient states she had bowel movement right before coming to ED. patient states this episode of psoriasis is diffuse throughout the whole body. patient denies any N/V/D, vag discharge, bleed, CP, weakness, SOB, HA, change in vision. patient taking hydrocortisone for psoriasis break out. patient denies any change in diet. Patient denies being seen by rheumatologist.    In ER labs unremarkable, TVUS unremarkable, CT scan significant for colitis, pt sent to CDU for pain control, serial abd exams, IV abx and GI consult     In CDU, pt did well, pain controlled, tolerating po, seen by GI- ok to f/up outpt, no abx for home

## 2020-09-23 NOTE — ED PROVIDER NOTE - PHYSICAL EXAMINATION
On Physical Exam:  General: well appearing, in NAD, speaking clearly in full sentences and without difficulty; cooperative with exam  HEENT: PERRL, MMM  Neck: no neck tenderness, no nuchal rigidity  Cardiac: normal s1, s2; RRR; no MGR  Lungs: CTABL  Abdomen: Pain to RLQ, pain to LUQ, soft nondistended  : no bladder tenderness or distension  Skin: intact, diffuse psoriasis throughout skin  Extremities: no peripheral edema, no gross deformities  Neuro: no gross neurologic deficits

## 2020-09-24 VITALS — DIASTOLIC BLOOD PRESSURE: 70 MMHG | HEART RATE: 72 BPM | TEMPERATURE: 98 F | SYSTOLIC BLOOD PRESSURE: 102 MMHG

## 2020-09-24 LAB — SARS-COV-2 RNA SPEC QL NAA+PROBE: SIGNIFICANT CHANGE UP

## 2020-09-24 PROCEDURE — 93975 VASCULAR STUDY: CPT

## 2020-09-24 PROCEDURE — 85025 COMPLETE CBC W/AUTO DIFF WBC: CPT

## 2020-09-24 PROCEDURE — 83690 ASSAY OF LIPASE: CPT

## 2020-09-24 PROCEDURE — 74177 CT ABD & PELVIS W/CONTRAST: CPT

## 2020-09-24 PROCEDURE — 76830 TRANSVAGINAL US NON-OB: CPT

## 2020-09-24 PROCEDURE — 96376 TX/PRO/DX INJ SAME DRUG ADON: CPT

## 2020-09-24 PROCEDURE — 80053 COMPREHEN METABOLIC PANEL: CPT

## 2020-09-24 PROCEDURE — G0378: CPT

## 2020-09-24 PROCEDURE — 93005 ELECTROCARDIOGRAM TRACING: CPT

## 2020-09-24 PROCEDURE — 99217: CPT

## 2020-09-24 PROCEDURE — 81003 URINALYSIS AUTO W/O SCOPE: CPT

## 2020-09-24 PROCEDURE — 96365 THER/PROPH/DIAG IV INF INIT: CPT | Mod: XU

## 2020-09-24 PROCEDURE — U0003: CPT

## 2020-09-24 PROCEDURE — 96375 TX/PRO/DX INJ NEW DRUG ADDON: CPT

## 2020-09-24 PROCEDURE — 99284 EMERGENCY DEPT VISIT MOD MDM: CPT | Mod: 25

## 2020-09-24 PROCEDURE — 84702 CHORIONIC GONADOTROPIN TEST: CPT

## 2020-09-24 RX ORDER — HYDROCORTISONE 1 %
1 OINTMENT (GRAM) TOPICAL THREE TIMES A DAY
Refills: 0 | Status: DISCONTINUED | OUTPATIENT
Start: 2020-09-24 | End: 2020-09-27

## 2020-09-24 RX ORDER — KETOROLAC TROMETHAMINE 30 MG/ML
15 SYRINGE (ML) INJECTION ONCE
Refills: 0 | Status: DISCONTINUED | OUTPATIENT
Start: 2020-09-24 | End: 2020-09-24

## 2020-09-24 RX ORDER — MORPHINE SULFATE 50 MG/1
4 CAPSULE, EXTENDED RELEASE ORAL EVERY 4 HOURS
Refills: 0 | Status: DISCONTINUED | OUTPATIENT
Start: 2020-09-24 | End: 2020-09-24

## 2020-09-24 RX ORDER — SODIUM CHLORIDE 9 MG/ML
1000 INJECTION INTRAMUSCULAR; INTRAVENOUS; SUBCUTANEOUS ONCE
Refills: 0 | Status: COMPLETED | OUTPATIENT
Start: 2020-09-24 | End: 2020-09-24

## 2020-09-24 RX ORDER — ACETAMINOPHEN 500 MG
975 TABLET ORAL EVERY 6 HOURS
Refills: 0 | Status: DISCONTINUED | OUTPATIENT
Start: 2020-09-24 | End: 2020-09-27

## 2020-09-24 RX ADMIN — MORPHINE SULFATE 4 MILLIGRAM(S): 50 CAPSULE, EXTENDED RELEASE ORAL at 00:20

## 2020-09-24 RX ADMIN — MORPHINE SULFATE 4 MILLIGRAM(S): 50 CAPSULE, EXTENDED RELEASE ORAL at 01:24

## 2020-09-24 RX ADMIN — Medication 15 MILLIGRAM(S): at 10:21

## 2020-09-24 RX ADMIN — FAMOTIDINE 20 MILLIGRAM(S): 10 INJECTION INTRAVENOUS at 08:35

## 2020-09-24 RX ADMIN — Medication 1 APPLICATION(S): at 10:37

## 2020-09-24 RX ADMIN — Medication 200 MILLIGRAM(S): at 00:20

## 2020-09-24 RX ADMIN — Medication 100 MILLIGRAM(S): at 02:02

## 2020-09-24 RX ADMIN — SODIUM CHLORIDE 1000 MILLILITER(S): 9 INJECTION INTRAMUSCULAR; INTRAVENOUS; SUBCUTANEOUS at 10:20

## 2020-09-24 RX ADMIN — Medication 975 MILLIGRAM(S): at 08:35

## 2020-09-24 RX ADMIN — Medication 500 MILLIGRAM(S): at 03:02

## 2020-09-24 NOTE — CONSULT NOTE ADULT - SUBJECTIVE AND OBJECTIVE BOX
Chief Complaint:  Patient is a 35y old  Female who presents with a chief complaint of     HPI: 36 yo F w/ PMHx psoriasis, recent uncomplicated vaginal delivery (4m ago) presenting w/ acute LLQ abd pain x 1 d  Patient w/ longstanding history of psoriasis, follows w/ dermatology, treated previously w/ topical steroids. Over past several weeks, has been having worsening flair of her psoriasis, involving >50% of exposed skin including extremities, trunk. Concurrently, patient w/ bilateral knee pain, and diffuse arthralgias. Denies oral ulcers, no vision changes. Plan was for biologic initiation.   On day PTA, patient w/ acute onset LLQ abd pain, nonradiating, persistent. Had some associated nausea, no vomiting. Having 2-3 large bowel movements per day, no hematochezia, no melena, no fecal incontinence, no incomplete evacuation. Denies FHx of IBD, does have FHx of h pylori (sister whom she lives with). She had uncomplicated vaginal delivery 4 months ago, has 1 prior , otherwise no abd surgeries. Denies NSAIDs.     On presentation to ED, patient HDS, labs wnl. Pelvic US normal, CT A/P w/ colitis of descending colon/rectum. Patient given morphine w/ improvement of pain, and zofran w/ improvement of nausea.     Allergies:  eggplant (Urticaria)  No Known Drug Allergies      Home Medications:    Hospital Medications:  acetaminophen   Tablet .. 975 milliGRAM(s) Oral every 6 hours PRN  ciprofloxacin   IVPB      ciprofloxacin   IVPB 400 milliGRAM(s) IV Intermittent every 12 hours  famotidine    Tablet 20 milliGRAM(s) Oral daily  hydrocortisone 2.5% Ointment 1 Application(s) Topical three times a day  ketorolac   Injectable 15 milliGRAM(s) IV Push once  metroNIDAZOLE  IVPB 500 milliGRAM(s) IV Intermittent every 8 hours  metroNIDAZOLE  IVPB      morphine  - Injectable 4 milliGRAM(s) IV Push every 4 hours PRN  ondansetron Injectable 4 milliGRAM(s) IV Push every 6 hours PRN  sodium chloride 0.9% Bolus 1000 milliLiter(s) IV Bolus once      PMHX/PSHX:  H/O:  section        Family history:      Denies family history of colon cancer/polyps, stomach cancer/polyps, pancreatic cancer/masses, liver cancer/disease, ovarian cancer and endometrial cancer.    Social History:   Tob: Denies  EtOH: Denies  Illicit Drugs: Denies    ROS:     General:  No wt loss, fevers, chills, night sweats, fatigue  Eyes:  Good vision, no reported pain  ENT:  No sore throat, pain, runny nose, dysphagia  CV:  No pain, palpitations, hypo/hypertension  Pulm:  No dyspnea, cough, tachypnea, wheezing  GI:  see HPI  :  No pain, bleeding, incontinence, nocturia  Muscle:  No pain, weakness  Neuro:  No weakness, tingling, memory problems  Psych:  No fatigue, insomnia, mood problems, depression  Endocrine:  No polyuria, polydipsia, cold/heat intolerance  Heme:  No petechiae, ecchymosis, easy bruisability  Skin:  No rash, tattoos, scars, edema    PHYSICAL EXAM:     GENERAL:  No acute distress  HEENT:  Normocephalic/atraumatic, no scleral icterus  CHEST:  Clear to auscultation bilaterally, no wheezes/rales/ronchi, no accessory muscle use  HEART:  Regular rate and rhythm, no murmurs/rubs/gallops  ABDOMEN:  Soft, non-tender, non-distended, normoactive bowel sounds,  no masses, no hepato-splenomegaly, no signs of chronic liver disease  EXTREMITIES: No cyanosis, clubbing, or edema  SKIN:  No rash/erythema/ecchymoses/petechiae/wounds/abscess/warm/dry  NEURO:  Alert and oriented x 3, no asterixis    Vital Signs:  Vital Signs Last 24 Hrs  T(C): 36.6 (24 Sep 2020 08:38), Max: 37.1 (23 Sep 2020 12:51)  T(F): 97.9 (24 Sep 2020 08:38), Max: 98.7 (23 Sep 2020 12:51)  HR: 63 (24 Sep 2020 08:38) (56 - 84)  BP: 102/69 (24 Sep 2020 08:38) (102/69 - 134/74)  BP(mean): --  RR: 18 (24 Sep 2020 08:38) (16 - 18)  SpO2: 97% (24 Sep 2020 08:38) (97% - 100%)  Daily Height in cm: 162.56 (23 Sep 2020 12:51)    Daily     LABS:                        14.5   9.60  )-----------( 325      ( 23 Sep 2020 14:57 )             46.6     Mean Cell Volume: 83.5 fl (- @ 14:57)        144  |  103  |  14  ----------------------------<  82  4.1   |  28  |  0.82    Ca    10.2      23 Sep 2020 14:57    TPro  8.5<H>  /  Alb  5.8<H>  /  TBili  0.3  /  DBili  x   /  AST  22  /  ALT  13  /  AlkPhos  73  09    LIVER FUNCTIONS - ( 23 Sep 2020 14:57 )  Alb: 5.8 g/dL / Pro: 8.5 g/dL / ALK PHOS: 73 U/L / ALT: 13 U/L / AST: 22 U/L / GGT: x             Urinalysis Basic - ( 23 Sep 2020 14:58 )    Color: Yellow / Appearance: Clear / S.020 / pH: x  Gluc: x / Ketone: Negative  / Bili: Negative / Urobili: Negative   Blood: x / Protein: Trace / Nitrite: Negative   Leuk Esterase: Negative / RBC: x / WBC x   Sq Epi: x / Non Sq Epi: x / Bacteria: x      Amylase Serum--      Lipase serum21       Ammonia--                          14.5   9.60  )-----------( 325      ( 23 Sep 2020 14:57 )             46.6       Imaging:  CT A/P  FINDINGS:    LOWER CHEST: Bibasilar subsegmental atelectasis.  LIVER: Within normal limits.  BILE DUCTS: Normal caliber.  GALLBLADDER: Within normal limits.  SPLEEN: Within normal limits.  PANCREAS: Within normal limits.  ADRENALS: Within normal limits.    KIDNEYS/URETERS: Left parapelvic cysts and nonobstructive left renal calculus measuring 4 mm. No hydronephrosis.  BLADDER: Minimally distended.  REPRODUCTIVE ORGANS: Uterus and adnexa within normal limits.    BOWEL: No bowel obstruction. Appendix is normal. Wall thickening and hyperemia involving the descending colon to the rectum.  PERITONEUM: Small pelvic free fluid.  VESSELS: Within normal limits.  RETROPERITONEUM/LYMPH NODES: No lymphadenopathy.  ABDOMINAL WALL: Within normal limits.  BONES: Within normal limits.    IMPRESSION:    Left-sided colitis involving the descending colon and rectosigmoid, infectious or inflammatory in etiology.             Chief Complaint:  Patient is a 35y old  Female who presents with a chief complaint of     HPI: 36 yo F w/ PMHx psoriasis, recent uncomplicated vaginal delivery (4m ago) presenting w/ acute LLQ abd pain x 1 d  Patient w/ longstanding history of psoriasis, follows w/ dermatology, treated previously w/ topical steroids. Over past several weeks, has been having worsening flair of her psoriasis, involving >50% of exposed skin including extremities, trunk. Concurrently, patient w/ bilateral knee pain, and diffuse arthralgias. Denies oral ulcers, no vision changes. Plan was for biologic initiation.   On day PTA, patient w/ acute onset LLQ abd pain, nonradiating, persistent. It only began 2 days ago and she has never had the same symptoms before. Had some associated nausea, no vomiting. Having 2-3 large bowel movements per day, no hematochezia, no melena, no fecal incontinence, no incomplete evacuation. Denies FHx of IBD, does have FHx of h pylori (sister whom she lives with). She had uncomplicated vaginal delivery 4 months ago, has 1 prior , otherwise no abd surgeries. Denies NSAIDs.     On presentation to ED, patient HDS, labs wnl. Pelvic US normal, CT A/P w/ colitis of descending colon/rectum. Patient given morphine w/ improvement of pain, and zofran w/ improvement of nausea.     Allergies:  eggplant (Urticaria)  No Known Drug Allergies      Home Medications:    Hospital Medications:  acetaminophen   Tablet .. 975 milliGRAM(s) Oral every 6 hours PRN  ciprofloxacin   IVPB      ciprofloxacin   IVPB 400 milliGRAM(s) IV Intermittent every 12 hours  famotidine    Tablet 20 milliGRAM(s) Oral daily  hydrocortisone 2.5% Ointment 1 Application(s) Topical three times a day  ketorolac   Injectable 15 milliGRAM(s) IV Push once  metroNIDAZOLE  IVPB 500 milliGRAM(s) IV Intermittent every 8 hours  metroNIDAZOLE  IVPB      morphine  - Injectable 4 milliGRAM(s) IV Push every 4 hours PRN  ondansetron Injectable 4 milliGRAM(s) IV Push every 6 hours PRN  sodium chloride 0.9% Bolus 1000 milliLiter(s) IV Bolus once      PMHX/PSHX:  H/O:  section        Family history:      Denies family history of colon cancer/polyps, stomach cancer/polyps, pancreatic cancer/masses, liver cancer/disease, ovarian cancer and endometrial cancer.    Social History:   Tob: Denies  EtOH: Denies  Illicit Drugs: Denies    ROS:     General:  No wt loss, fevers, chills, night sweats, fatigue  Eyes:  Good vision, no reported pain  ENT:  No sore throat, pain, runny nose, dysphagia  CV:  No pain, palpitations, hypo/hypertension  Pulm:  No dyspnea, cough, tachypnea, wheezing  GI:  see HPI  :  No pain, bleeding, incontinence, nocturia  Muscle:  No pain, weakness  Neuro:  No weakness, tingling, memory problems  Psych:  No fatigue, insomnia, mood problems, depression  Endocrine:  No polyuria, polydipsia, cold/heat intolerance  Heme:  No petechiae, ecchymosis, easy bruisability  Skin:  No rash, tattoos, scars, edema    PHYSICAL EXAM:     GENERAL:  No acute distress  HEENT:  Normocephalic/atraumatic, no scleral icterus  CHEST:  Clear to auscultation bilaterally, no wheezes/rales/ronchi, no accessory muscle use  HEART:  Regular rate and rhythm, no murmurs/rubs/gallops  ABDOMEN:  Soft, non-tender, non-distended, normoactive bowel sounds,  no masses, no hepato-splenomegaly, no signs of chronic liver disease  EXTREMITIES: No cyanosis, clubbing, or edema  SKIN:  No rash/erythema/ecchymoses/petechiae/wounds/abscess/warm/dry  NEURO:  Alert and oriented x 3, no asterixis    Vital Signs:  Vital Signs Last 24 Hrs  T(C): 36.6 (24 Sep 2020 08:38), Max: 37.1 (23 Sep 2020 12:51)  T(F): 97.9 (24 Sep 2020 08:38), Max: 98.7 (23 Sep 2020 12:51)  HR: 63 (24 Sep 2020 08:38) (56 - 84)  BP: 102/69 (24 Sep 2020 08:38) (102/69 - 134/74)  BP(mean): --  RR: 18 (24 Sep 2020 08:38) (16 - 18)  SpO2: 97% (24 Sep 2020 08:38) (97% - 100%)  Daily Height in cm: 162.56 (23 Sep 2020 12:51)    Daily     LABS:                        14.5   9.60  )-----------( 325      ( 23 Sep 2020 14:57 )             46.6     Mean Cell Volume: 83.5 fl (- @ 14:57)        144  |  103  |  14  ----------------------------<  82  4.1   |  28  |  0.82    Ca    10.2      23 Sep 2020 14:57    TPro  8.5<H>  /  Alb  5.8<H>  /  TBili  0.3  /  DBili  x   /  AST  22  /  ALT  13  /  AlkPhos  73      LIVER FUNCTIONS - ( 23 Sep 2020 14:57 )  Alb: 5.8 g/dL / Pro: 8.5 g/dL / ALK PHOS: 73 U/L / ALT: 13 U/L / AST: 22 U/L / GGT: x             Urinalysis Basic - ( 23 Sep 2020 14:58 )    Color: Yellow / Appearance: Clear / S.020 / pH: x  Gluc: x / Ketone: Negative  / Bili: Negative / Urobili: Negative   Blood: x / Protein: Trace / Nitrite: Negative   Leuk Esterase: Negative / RBC: x / WBC x   Sq Epi: x / Non Sq Epi: x / Bacteria: x      Amylase Serum--      Lipase serum21       Ammonia--                          14.5   9.60  )-----------( 325      ( 23 Sep 2020 14:57 )             46.6       Imaging:  CT A/P  FINDINGS:    LOWER CHEST: Bibasilar subsegmental atelectasis.  LIVER: Within normal limits.  BILE DUCTS: Normal caliber.  GALLBLADDER: Within normal limits.  SPLEEN: Within normal limits.  PANCREAS: Within normal limits.  ADRENALS: Within normal limits.    KIDNEYS/URETERS: Left parapelvic cysts and nonobstructive left renal calculus measuring 4 mm. No hydronephrosis.  BLADDER: Minimally distended.  REPRODUCTIVE ORGANS: Uterus and adnexa within normal limits.    BOWEL: No bowel obstruction. Appendix is normal. Wall thickening and hyperemia involving the descending colon to the rectum.  PERITONEUM: Small pelvic free fluid.  VESSELS: Within normal limits.  RETROPERITONEUM/LYMPH NODES: No lymphadenopathy.  ABDOMINAL WALL: Within normal limits.  BONES: Within normal limits.    IMPRESSION:    Left-sided colitis involving the descending colon and rectosigmoid, infectious or inflammatory in etiology.

## 2020-09-24 NOTE — CONSULT NOTE ADULT - ATTENDING COMMENTS
Patient seen and examined. Agree with above. Patient presented with 2 days of abdominal pain - she has not had any diarrhea or rectal bleeding. She is now tolerating PO - had liquid diet this morning and also has minimal pain now. Suspect more likely infectious gastroenteritis/colitis rather than IBD - but given psoriasis and arthritis, can consider colonoscopy. If she is continuing to improve and tolerating PO, can consider follow-up as outpatient for colonoscopy and follow-up prior to treatment of psoriasis with immunosuppresion.

## 2020-09-24 NOTE — ED ADULT NURSE REASSESSMENT NOTE - NS ED NURSE REASSESS COMMENT FT1
07.00 AM Received report from SIMONE Yu  pt is observed for abdominal pain for pain management.  Received  pt A&OX 4  obeys commands moves all 4 extremities denies N/V/D fever chills CP SOB  dizziness headache. Pt has stable  vitals  Comfort care &  safety measures maintained  Pt denies any pain    IV site looks clean & dry  no  signs of infiltration  noted pt denies pain @ IV site  . Pt advised to call for help  Call bell with in the reach  pt verbalized the understanding Continue to monitor abdomen soft tender pt C/O head ache medicated as per order Pt  has  psoriasis C/O itching   08.00 Pt getting evaluated by GI team  Pt kept NPO for possible sigmoido scopy  Continue to monitor
11.00 Am Pt was evaluated by  CDU MD Nathanael Griffith  & GI team pt is feeling better . Pt is able to tolerate PO challenges Pt is discharged ML out.  KEATON Chong explained the follow up care & gave the discharge summary . Pt has stable vitals steady gait A&OX 4 at the time of Discharge
Pt remains in the ED. Resting comfortably in bed. A&Ox3. Breathing spontaneously and unlabored. NAD. Pt given breast pump and given Tylenol. Privacy provided in mabry. Pt safety maintained. Will continue to monitor.  Pt agreeable to wait for CT results.
Pt remains in the ED. Resting comfortably in bed. A&Ox3. Breathing spontaneously and unlabored. NAD. Pt offered breast pump, and privacy screen in order to pump. Pt feels that this is insufficient to her needs, pt is upset and would like to leave. Offered Tylenol and asked if anything else could be done to make her more comfortable. Pt states that she would like to be discharged. WILFRIDO Barlow and Dodie notified and spoke to pt. Pt safety maintained. Will continue to monitor. Awaiting dispo.
Received report from [place] NICHOLS. Patient resting comfortably in stretcher. A&Ox4. Patient in no acute distress. Patient transported for CT scan of abdomen and pelvis. Plan of care discussed. Safety and comfort measures maintained.
Received pt from SIMONE Phillips, received pt alert and responsive, oriented x4, denies any respiratory distress, SOB, or difficulty breathing. Pt transferred to CDU for abdominal pain. Pt is currently pain/ nausea free at this time. Pending IV flagyl q8 and ciprofloxacin q12. IV in place, patent and free of signs of infiltration, pt denies chest pain or palpitations, V/S stable, pt afebrile, pt denies pain at this time. Pt educated on unit and unit rules, instructed patient to notify RN of any needed assistance, Pt verbalizes understanding, Call bell placed within reach. Safety maintained. Will continue to monitor.
Rendering Text In Billing: The slides were read, and reported in an attached document.

## 2020-09-24 NOTE — ED CDU PROVIDER SUBSEQUENT DAY NOTE - ATTENDING CONTRIBUTION TO CARE
I have personally performed a face to face medical and diagnostic evaluation of the patient. I have discussed with and reviewed the ACP's note and agree with the History, ROS, Physical Exam and MDM unless otherwise indicated. A brief summary of my personal evaluation and impression can be found below.    Note from previous day: 35 year old fm with no pmhx presenting to the ED with ULQ and LRQ abd pain x 4 hours. patient states she had transvaginal delivery 4 months ago. patient states last week patient had a break out of psoriasis with joint pain. patient states she had bowel movement right before coming to ED. patient states this episode of psoriasis is diffuse throughout the whole body. patient denies any N/V/D, vag discharge, bleed, CP, weakness, SOB, HA, change in vision. patient taking hydrocortisone for psoriasis break out. patient denies any change in diet. Patient denies being seen by rheumatologist.    No acute events overnight. Pain controlled. Pt feels well, still reports some abdominal discomfort. Otherwise without complaints.     Sarai MORENO:  VITALS: Initial triage and subsequent vitals have been reviewed by me.  GEN APPEARANCE: WDWN, alert, non-toxic, NAD  HEAD: Atraumatic.  EYES: PERRLa, EOMI, vision grossly intact.   NECK: Supple  CV: RRR, S1S2, no c/r/m/g. Cap refill <2 seconds. No bruits.   LUNGS: CTAB. No abnormal breath sounds.  ABDOMEN: mild diffuse abdominal ttp, worse L sided  MSK/EXT: No spinal or extremity point tenderness. No CVA ttp. Pelvis stable. No peripheral edema.  NEURO: Alert, follows commands. Weight bearing normal. Speech normal. Sensation and motor normal x4 extremities.   SKIN: Warm, dry and intact. No rash.  PSYCH: Appropriate    Plan/MDM: 35F breastfeeding found to have colitis on CT s/p ABX pending GI eval in a.m. Per GI at bedside pt stable for discharge pending PO challenge, PO meds. Colonoscopy/Sigmoidoscopy to be done outpt.  Likely dc thereafter w abx, GI follow up. I have personally performed a face to face medical and diagnostic evaluation of the patient. I have discussed with and reviewed the ACP's note and agree with the History, ROS, Physical Exam and MDM unless otherwise indicated. A brief summary of my personal evaluation and impression can be found below.    Note from previous day: 35 year old fm with no pmhx presenting to the ED with ULQ and LRQ abd pain x 4 hours. patient states she had transvaginal delivery 4 months ago. patient states last week patient had a break out of psoriasis with joint pain. patient states she had bowel movement right before coming to ED. patient states this episode of psoriasis is diffuse throughout the whole body. patient denies any N/V/D, vag discharge, bleed, CP, weakness, SOB, HA, change in vision. patient taking hydrocortisone for psoriasis break out. patient denies any change in diet. Patient denies being seen by rheumatologist.    No acute events overnight. Pain controlled. Pt feels well, still reports some abdominal discomfort. +mild b/l frontal HA. Otherwise without complaints.     Sarai MORENO:  VITALS: Initial triage and subsequent vitals have been reviewed by me.  GEN APPEARANCE: WDWN, alert, non-toxic, NAD  HEAD: Atraumatic.  EYES: PERRLa, EOMI, vision grossly intact.   NECK: Supple  CV: RRR, S1S2, no c/r/m/g. Cap refill <2 seconds. No bruits.   LUNGS: CTAB. No abnormal breath sounds.  ABDOMEN: mild diffuse abdominal ttp, worse L sided  MSK/EXT: No spinal or extremity point tenderness. No CVA ttp. Pelvis stable. No peripheral edema.  NEURO: Alert, follows commands. Weight bearing normal. Speech normal. Sensation and motor normal x4 extremities.   SKIN: Warm, dry and intact. No rash.  PSYCH: Appropriate    Plan/MDM: 35F breastfeeding found to have colitis on CT s/p ABX pending GI eval in a.m. Per GI at bedside pt stable for discharge pending PO challenge, PO meds. Colonoscopy/Sigmoidoscopy to be done outpt.  Likely dc thereafter w abx, GI follow up.

## 2020-09-24 NOTE — CONSULT NOTE ADULT - ASSESSMENT
Impression:  34 yo F w/ PMHx psoriasis and recent uncomplicated vaginal delivery presenting w/ acute LLQ pain x 1 day w/ associated nausea, found to have CT evidence of colitis in descending colon/rectosigmoid    # abd pain - likely correlates w/ colitis seen on CT, etiology likely infectious (would be supported by associated nausea) vs inflammatory. Inflammatory bowel disease (more likely UC>CD) is certainly possible, and given patient's history of psoriasis raises that possibility. Several of the biologics used for psoriasis (ex TNF-alpha inhibitors) will have efficacy in treating IBD as well, and may mask any diagnostic ability. Therefore, endoscopic evaluation to both diagnose IBD and assess severity is essential prior to initiating psoriasis-directed biologic therapies. However, patient is currently pain free, tolerating PO.     Recommendations:  - PO challenge, if tolerating diet can follow up as outpatient for colonoscopy  - patient should call [facility] [phone] to schedule appointment within 1-2 weeks   - no need for antibiotics    Thank you for this interesting consult.     Miguel Angel Arreola, PGY4  Gastroenterology Fellow  Available on Microsoft Teams  [phone] ([facility] Short Range Pager)  [phone] (Long Range Pager)    After 5pm, please contact the on-call GI fellow. [phone]

## 2020-09-24 NOTE — ED CDU PROVIDER SUBSEQUENT DAY NOTE - PROGRESS NOTE DETAILS
Pt resting comfortably. NAD. No complaints. VSS. abd pain improved, pt with mild epigastric ttp, improved since previous exam will continue to monitor. -Irina Agosto PA-C CDU NOTE KEATON Chong: pt in the middle of pumping, asked if she would rather me come back later and she said "yes." CDU NOTE KEATON Chong: pt resting comfortably, feels well just some lower abdominal cramping. NAD VSS. pt seen by GI attending- if tolerates po, ok to d/c home to f/up outpt for colonoscopy. no need for abx. CDU NOTE KEATON Chong: pt tolerated po without issue. pt stable for d/c per Dr. Moon

## 2020-09-24 NOTE — ED CDU PROVIDER SUBSEQUENT DAY NOTE - HISTORY
Pt resting comfortably. NAD. No complaints. VSS. Abd pain improved with morphine, +mild epigastric ttp, will continue to monitor. -Irina Agosto PA-C

## 2020-09-24 NOTE — ED ADULT NURSE REASSESSMENT NOTE - COMFORT CARE
repositioned/warm blanket provided/ambulated to bathroom/plan of care explained/po fluids offered/darkened lights

## 2020-09-28 ENCOUNTER — APPOINTMENT (OUTPATIENT)
Dept: DERMATOLOGY | Facility: CLINIC | Age: 35
End: 2020-09-28
Payer: MEDICAID

## 2020-09-28 VITALS — TEMPERATURE: 97.3 F

## 2020-09-28 VITALS — BODY MASS INDEX: 22.49 KG/M2 | HEIGHT: 65 IN | WEIGHT: 135 LBS

## 2020-09-28 DIAGNOSIS — Z79.899 OTHER LONG TERM (CURRENT) DRUG THERAPY: ICD-10-CM

## 2020-09-28 PROCEDURE — 99214 OFFICE O/P EST MOD 30 MIN: CPT

## 2020-09-28 RX ORDER — BETAMETHASONE DIPROPIONATE 0.5 MG/G
0.05 OINTMENT TOPICAL TWICE DAILY
Qty: 2 | Refills: 3 | Status: DISCONTINUED | COMMUNITY
Start: 2020-09-01 | End: 2020-09-28

## 2020-09-28 RX ORDER — BETAMETHASONE DIPROPIONATE 0.5 MG/G
0.05 OINTMENT, AUGMENTED TOPICAL
Qty: 1 | Refills: 4 | Status: ACTIVE | COMMUNITY
Start: 2020-09-28 | End: 1900-01-01

## 2020-09-28 RX ORDER — GUSELKUMAB 100 MG/ML
100 INJECTION SUBCUTANEOUS
Qty: 5 | Refills: 0 | Status: DISCONTINUED | COMMUNITY
Start: 2020-09-28 | End: 2020-09-28

## 2020-09-29 RX ORDER — TACROLIMUS 1 MG/G
0.1 OINTMENT TOPICAL
Qty: 1 | Refills: 1 | Status: ACTIVE | COMMUNITY
Start: 2020-09-28

## 2020-10-01 ENCOUNTER — APPOINTMENT (OUTPATIENT)
Dept: OBGYN | Facility: CLINIC | Age: 35
End: 2020-10-01

## 2020-10-08 ENCOUNTER — APPOINTMENT (OUTPATIENT)
Dept: OBGYN | Facility: CLINIC | Age: 35
End: 2020-10-08

## 2020-10-08 LAB
HBV CORE IGG+IGM SER QL: NONREACTIVE
HBV CORE IGM SER QL: NONREACTIVE
HBV SURFACE AB SER QL: REACTIVE
HBV SURFACE AG SER QL: NONREACTIVE
HCV AB SER QL: NONREACTIVE
HCV S/CO RATIO: 0.27 S/CO
HIV1+2 AB SPEC QL IA.RAPID: NONREACTIVE

## 2020-10-09 LAB
M TB IFN-G BLD-IMP: NEGATIVE
QUANTIFERON TB PLUS MITOGEN MINUS NIL: 8.02 IU/ML
QUANTIFERON TB PLUS NIL: 0.02 IU/ML
QUANTIFERON TB PLUS TB1 MINUS NIL: 0 IU/ML
QUANTIFERON TB PLUS TB2 MINUS NIL: 0 IU/ML

## 2020-10-09 RX ORDER — BETAMETHASONE DIPROPIONATE 0.5 MG/G
0.05 CREAM, AUGMENTED TOPICAL
Qty: 1 | Refills: 1 | Status: ACTIVE | COMMUNITY
Start: 2020-10-09 | End: 1900-01-01

## 2020-10-12 ENCOUNTER — APPOINTMENT (OUTPATIENT)
Dept: INTERNAL MEDICINE | Facility: CLINIC | Age: 35
End: 2020-10-12

## 2020-10-14 ENCOUNTER — APPOINTMENT (OUTPATIENT)
Dept: RHEUMATOLOGY | Facility: CLINIC | Age: 35
End: 2020-10-14
Payer: MEDICAID

## 2020-10-14 VITALS
DIASTOLIC BLOOD PRESSURE: 81 MMHG | BODY MASS INDEX: 22.66 KG/M2 | SYSTOLIC BLOOD PRESSURE: 116 MMHG | TEMPERATURE: 98 F | OXYGEN SATURATION: 98 % | WEIGHT: 136 LBS | HEART RATE: 76 BPM | HEIGHT: 65 IN

## 2020-10-14 DIAGNOSIS — K52.9 NONINFECTIVE GASTROENTERITIS AND COLITIS, UNSPECIFIED: ICD-10-CM

## 2020-10-14 PROCEDURE — 99204 OFFICE O/P NEW MOD 45 MIN: CPT

## 2020-10-14 RX ORDER — DIAZEPAM 5 MG/1
5 TABLET ORAL
Qty: 1 | Refills: 0 | Status: DISCONTINUED | COMMUNITY
Start: 2019-01-04 | End: 2020-10-14

## 2020-10-14 RX ORDER — AMOXICILLIN AND CLAVULANATE POTASSIUM 875; 125 MG/1; MG/1
875-125 TABLET, COATED ORAL
Qty: 20 | Refills: 0 | Status: DISCONTINUED | COMMUNITY
Start: 2019-02-04 | End: 2020-10-14

## 2020-10-14 RX ORDER — CELECOXIB 100 MG/1
100 CAPSULE ORAL TWICE DAILY
Qty: 60 | Refills: 2 | Status: DISCONTINUED | COMMUNITY
Start: 2019-01-22 | End: 2020-10-14

## 2020-10-14 RX ORDER — AZELAIC ACID 0.15 G/G
15 GEL TOPICAL
Qty: 1 | Refills: 3 | Status: DISCONTINUED | COMMUNITY
Start: 2020-09-28 | End: 2020-10-14

## 2020-10-14 RX ORDER — OXYCODONE AND ACETAMINOPHEN 5; 325 MG/1; MG/1
5-325 TABLET ORAL
Qty: 12 | Refills: 0 | Status: DISCONTINUED | COMMUNITY
Start: 2019-02-04 | End: 2020-10-14

## 2020-10-14 RX ORDER — GUSELKUMAB 100 MG/ML
100 INJECTION SUBCUTANEOUS
Qty: 3 | Refills: 0 | Status: DISCONTINUED | COMMUNITY
Start: 2020-09-28 | End: 2020-10-14

## 2020-10-14 RX ORDER — TRIAMCINOLONE ACETONIDE 1 MG/G
0.1 OINTMENT TOPICAL
Qty: 60 | Refills: 0 | Status: ACTIVE | COMMUNITY
Start: 2020-09-26

## 2020-10-14 RX ORDER — HYDROCORTISONE 10 MG/G
1 OINTMENT TOPICAL TWICE DAILY
Qty: 1 | Refills: 2 | Status: DISCONTINUED | COMMUNITY
Start: 2019-11-15 | End: 2020-10-14

## 2020-10-16 ENCOUNTER — APPOINTMENT (OUTPATIENT)
Dept: GASTROENTEROLOGY | Facility: CLINIC | Age: 35
End: 2020-10-16
Payer: MEDICAID

## 2020-10-16 VITALS
HEIGHT: 65 IN | SYSTOLIC BLOOD PRESSURE: 110 MMHG | HEART RATE: 68 BPM | BODY MASS INDEX: 21.99 KG/M2 | TEMPERATURE: 98.2 F | DIASTOLIC BLOOD PRESSURE: 70 MMHG | OXYGEN SATURATION: 99 % | WEIGHT: 132 LBS

## 2020-10-16 DIAGNOSIS — R10.11 RIGHT UPPER QUADRANT PAIN: ICD-10-CM

## 2020-10-16 PROCEDURE — 99203 OFFICE O/P NEW LOW 30 MIN: CPT

## 2020-10-16 RX ORDER — POLYETHYLENE GLYCOL 3350 AND ELECTROLYTES WITH LEMON FLAVOR 236; 22.74; 6.74; 5.86; 2.97 G/4L; G/4L; G/4L; G/4L; G/4L
236 POWDER, FOR SOLUTION ORAL
Qty: 1 | Refills: 0 | Status: ACTIVE | COMMUNITY
Start: 2020-10-16 | End: 1900-01-01

## 2020-10-17 NOTE — PHYSICAL EXAM
[General Appearance - In No Acute Distress] : in no acute distress [General Appearance - Alert] : alert [Sclera] : the sclera and conjunctiva were normal [Extraocular Movements] : extraocular movements were intact [PERRL With Normal Accommodation] : pupils were equal in size, round, and reactive to light [Outer Ear] : the ears and nose were normal in appearance [Neck Appearance] : the appearance of the neck was normal [Oropharynx] : the oropharynx was normal [Neck Cervical Mass (___cm)] : no neck mass was observed [Jugular Venous Distention Increased] : there was no jugular-venous distention [Thyroid Diffuse Enlargement] : the thyroid was not enlarged [Heart Sounds] : normal S1 and S2 [Heart Rate And Rhythm] : heart rate was normal and rhythm regular [Thyroid Nodule] : there were no palpable thyroid nodules [Heart Sounds Pericardial Friction Rub] : no pericardial rub [Heart Sounds Gallop] : no gallops [Murmurs] : no murmurs [Edema] : there was no peripheral edema [Full Pulse] : the pedal pulses are present [Breast Appearance] : normal in appearance [Breast Palpation Mass] : no palpable masses [Abdomen Soft] : soft [Bowel Sounds] : normal bowel sounds [] : no hepato-splenomegaly [Abdomen Mass (___ Cm)] : no abdominal mass palpated [FreeTextEntry1] : RLQ AND LLQ tend

## 2020-10-17 NOTE — ASSESSMENT
[FreeTextEntry1] : This patient has right and left lower quadrant tenderness. My plan will be\par Stool studies\par Sonogram\par Colonoscopy

## 2020-10-17 NOTE — HISTORY OF PRESENT ILLNESS
[de-identified] : This patient is 35 years old with psoriasis and intense left abdominal pain recently and went to the emergency room and a CT scan showed colitis. No current diarrhea. She went to see a doctor but got who did stool studies and blood studies. Don't have this report. The symptoms came on acutely. She just had a baby 4 months ago. She had a flare of her psoriasis with this episode. She is only on steroid cream currently for the psoriasis. No family history of IVD. No family history of HLA-B27 negative diseases. No diarrhea. She has not had a colonoscopy as of yet.

## 2020-10-21 ENCOUNTER — LABORATORY RESULT (OUTPATIENT)
Age: 35
End: 2020-10-21

## 2020-10-21 LAB
DEPRECATED O AND P PREP STL: NORMAL
G LAMBLIA AG STL QL: NORMAL

## 2020-10-22 ENCOUNTER — LABORATORY RESULT (OUTPATIENT)
Age: 35
End: 2020-10-22

## 2020-10-22 ENCOUNTER — APPOINTMENT (OUTPATIENT)
Dept: OBGYN | Facility: CLINIC | Age: 35
End: 2020-10-22
Payer: MEDICAID

## 2020-10-22 ENCOUNTER — OUTPATIENT (OUTPATIENT)
Dept: OUTPATIENT SERVICES | Facility: HOSPITAL | Age: 35
LOS: 1 days | End: 2020-10-22
Payer: MEDICAID

## 2020-10-22 VITALS — WEIGHT: 135 LBS | BODY MASS INDEX: 22.47 KG/M2 | DIASTOLIC BLOOD PRESSURE: 64 MMHG | SYSTOLIC BLOOD PRESSURE: 110 MMHG

## 2020-10-22 DIAGNOSIS — Z98.891 HISTORY OF UTERINE SCAR FROM PREVIOUS SURGERY: Chronic | ICD-10-CM

## 2020-10-22 DIAGNOSIS — Z11.3 ENCOUNTER FOR SCREENING FOR INFECTIONS WITH A PREDOMINANTLY SEXUAL MODE OF TRANSMISSION: ICD-10-CM

## 2020-10-22 DIAGNOSIS — Z30.9 ENCOUNTER FOR CONTRACEPTIVE MANAGEMENT, UNSPECIFIED: ICD-10-CM

## 2020-10-22 DIAGNOSIS — Z01.419 ENCOUNTER FOR GYNECOLOGICAL EXAMINATION (GENERAL) (ROUTINE) W/OUT ABNORMAL FINDINGS: ICD-10-CM

## 2020-10-22 DIAGNOSIS — R10.2 PELVIC AND PERINEAL PAIN: ICD-10-CM

## 2020-10-22 DIAGNOSIS — N76.0 ACUTE VAGINITIS: ICD-10-CM

## 2020-10-22 PROCEDURE — 99214 OFFICE O/P EST MOD 30 MIN: CPT | Mod: NC

## 2020-10-22 PROCEDURE — 36415 COLL VENOUS BLD VENIPUNCTURE: CPT | Mod: NC

## 2020-10-22 RX ORDER — ETONOGESTREL AND ETHINYL ESTRADIOL .12; .015 MG/D; MG/D
0.12-0.015 RING VAGINAL
Qty: 6 | Refills: 2 | Status: ACTIVE | COMMUNITY
Start: 2020-10-22 | End: 1900-01-01

## 2020-10-22 NOTE — COUNSELING
[Body Image] : body image [Contraception/ Emergency Contraception/ Safe Sexual Practices] : contraception, emergency contraception, safe sexual practices

## 2020-10-23 LAB
HBV SURFACE AG SER-ACNC: SIGNIFICANT CHANGE UP
HCV AB S/CO SERPL IA: 0.33 S/CO — SIGNIFICANT CHANGE UP (ref 0–0.99)
HCV AB SERPL-IMP: SIGNIFICANT CHANGE UP
HIV 1+2 AB+HIV1 P24 AG SERPL QL IA: SIGNIFICANT CHANGE UP
HPV HIGH+LOW RISK DNA PNL CVX: SIGNIFICANT CHANGE UP
T PALLIDUM AB TITR SER: NEGATIVE — SIGNIFICANT CHANGE UP

## 2020-10-23 PROCEDURE — 36415 COLL VENOUS BLD VENIPUNCTURE: CPT

## 2020-10-23 PROCEDURE — 87389 HIV-1 AG W/HIV-1&-2 AB AG IA: CPT

## 2020-10-23 PROCEDURE — 86803 HEPATITIS C AB TEST: CPT

## 2020-10-23 PROCEDURE — 87624 HPV HI-RISK TYP POOLED RSLT: CPT

## 2020-10-23 PROCEDURE — 81025 URINE PREGNANCY TEST: CPT

## 2020-10-23 PROCEDURE — 86780 TREPONEMA PALLIDUM: CPT

## 2020-10-23 PROCEDURE — G0463: CPT

## 2020-10-23 PROCEDURE — 87340 HEPATITIS B SURFACE AG IA: CPT

## 2020-10-23 NOTE — PHYSICAL EXAM
[Appropriately responsive] : appropriately responsive [Alert] : alert [No Acute Distress] : no acute distress [No Lymphadenopathy] : no lymphadenopathy [Regular Rate Rhythm] : regular rate rhythm [No Murmurs] : no murmurs [Clear to Auscultation B/L] : clear to auscultation bilaterally [Soft] : soft [Non-distended] : non-distended [No HSM] : No HSM [No Lesions] : no lesions [No Mass] : no mass [Oriented x3] : oriented x3 [Examination Of The Breasts] : a normal appearance [No Masses] : no breast masses were palpable [Labia Majora] : normal [Labia Minora] : normal [Normal] : normal [Adnexa Tenderness] : tender [Ovarian Mass (___ Cm)] : mass present [FreeTextEntry7] : Diffuse  lower abdominal pain  on palpation   bilateral  [Tenderness] : nontender [Enlarged ___ wks] : not enlarged [Mass ___ cm] : no uterine mass was palpated

## 2020-10-23 NOTE — HISTORY OF PRESENT ILLNESS
[HIV Test offered] : HIV Test offered [Syphilis test offered] : Syphilis test offered [Gonorrhea test offered] : Gonorrhea test offered [HPV test offered] : HPV test offered [Hepatitis B test offered] : Hepatitis B test offered [Hepatitis C test offered] : Hepatitis C test offered [N] : Patient denies prior pregnancies [Diffuse] : diffuse [Bowel Movement] : bowel movement [Ultrasound] : ultrasound [CT] : CT [Previously active] : previously active [Men] : men [Vaginal] : vaginal [Oral] : oral [No] : No [Patient would like to be screened for STIs] : Patient would like to be screened for STIs [TextBox_4] : 36 y/o   s/p - in [place]  - 2020  Pt  States psot delivery has a sever flair of Psoriases  currently being folloewed by Derm. Pt  Awaitng to start a biologic  . Pt  S/p episode of colitis  -  L sigmoid colon  2020 [facility] /u colonoscopy scheduled  10/30 /20  . \par Pt has cronic pelvic paun  .Pt denies sexual acitivity since delivered in [place]  -  Spouse currently in [place] won't be back for 2 mos  .Pt denies vag d/c . Pt has diarrhea  - S/p C-diff neg and Giardia  - neg  in stool cultures  \par Pt deneis HX abnormal pap, fibroids , ovarain cysts   [LMPDate] : 2019  [PGHxTotal] : 2 [[facility]] : 2 [[facility]] : 2 [FreeTextEntry1] : 2-19 [FreeTextEntry2] : spouse  lives in [place]

## 2020-10-23 NOTE — DISCUSSION/SUMMARY
[FreeTextEntry1] : 34 y/o    \par #  S/p TOLAC -   ACute episode colitis  2020  now  has  Pelvic pain \par Sono ordered  \par STD screen sent  \par Neg CMT  \par PAP,gc,chl  done  \par Pt wiil call for sono  results  \par Colonoscopy scheduled  10.30/20 has seen  GI  10/16/20  \par Pelvic pain most likely due to  colitis  \par # Psoriases' flair - has f/u  w/ Derm  \par # contraception  - UCG neg  - not sexually active partner in  [place] and breast feeding  - Start Nuvaring  \par \par B. Myesha PAC  \par

## 2020-10-25 LAB
C DIFF TOXIN B QL PCR REFLEX: NORMAL
GDH ANTIGEN: NOT DETECTED
TOXIN A AND B: NOT DETECTED

## 2020-10-28 ENCOUNTER — OUTPATIENT (OUTPATIENT)
Dept: OUTPATIENT SERVICES | Facility: HOSPITAL | Age: 35
LOS: 1 days | End: 2020-10-28
Payer: MEDICAID

## 2020-10-28 ENCOUNTER — APPOINTMENT (OUTPATIENT)
Dept: GASTROENTEROLOGY | Facility: HOSPITAL | Age: 35
End: 2020-10-28

## 2020-10-28 DIAGNOSIS — Z98.891 HISTORY OF UTERINE SCAR FROM PREVIOUS SURGERY: Chronic | ICD-10-CM

## 2020-10-28 DIAGNOSIS — R19.7 DIARRHEA, UNSPECIFIED: ICD-10-CM

## 2020-10-28 LAB — HCG UR QL: NEGATIVE — SIGNIFICANT CHANGE UP

## 2020-10-28 PROCEDURE — 45378 DIAGNOSTIC COLONOSCOPY: CPT

## 2020-10-28 PROCEDURE — 81025 URINE PREGNANCY TEST: CPT

## 2020-10-28 NOTE — PROGRESS NOTE ADULT - SUBJECTIVE AND OBJECTIVE BOX
Colonoscopy Report  Indication: Abnl CT scan  Referring:    Instrument: [id number]   Anesthesia: MAC  Consent:  Informed consent was obtained from the patient after providing any opportunity for questions  Procedure: After placing the patient in the left lateral decubitus position, the colonoscope was gently inserted into the rectum and advanced to the cecum. Color, texture, mucosa, and anatomy of the colon were carefully examined with the scope. The patient tolerated the procedure well. After completion of the exam, the patient was transferred to the recovery room.     Preparation:  Findings:   Anal Canal	Normal  Rectum	Normal  Sigmoid Colon 	Normal  Descending Colon	Normal  Splenic Flexure	Normal  Transverse Colon	Normal  Hepatic Flexure	Normal  Ascending Colon	 Normal  Cecum	Normal  Ileo-cecal Valve	Normal  Ileum 	normal  Date and time:   EBL:0    Impression: Normal colonoscopy    Plan  F/U PRN                      Procedure Start Time:  12:55  Cecum Reached Time: 13:00  Procedure End Time:   13:08  Total Withdrawal Time:                                 Attending:     Dalton Goins   Date and Time:

## 2020-10-29 DIAGNOSIS — Z30.9 ENCOUNTER FOR CONTRACEPTIVE MANAGEMENT, UNSPECIFIED: ICD-10-CM

## 2020-10-29 DIAGNOSIS — Z01.419 ENCOUNTER FOR GYNECOLOGICAL EXAMINATION (GENERAL) (ROUTINE) WITHOUT ABNORMAL FINDINGS: ICD-10-CM

## 2020-10-29 DIAGNOSIS — Z11.3 ENCOUNTER FOR SCREENING FOR INFECTIONS WITH A PREDOMINANTLY SEXUAL MODE OF TRANSMISSION: ICD-10-CM

## 2020-10-29 DIAGNOSIS — R10.2 PELVIC AND PERINEAL PAIN: ICD-10-CM

## 2020-11-01 LAB
LACTOFERRIN STL-MCNC: <1
PANCREATIC ELASTASE, FECAL: 292
SARS-COV-2 N GENE NPH QL NAA+PROBE: NOT DETECTED

## 2020-11-03 ENCOUNTER — NON-APPOINTMENT (OUTPATIENT)
Age: 35
End: 2020-11-03

## 2020-11-03 ENCOUNTER — APPOINTMENT (OUTPATIENT)
Dept: DERMATOLOGY | Facility: CLINIC | Age: 35
End: 2020-11-03
Payer: MEDICAID

## 2020-11-03 PROCEDURE — 96910 PHOTCHMTX TAR&UVB/PTRLTM&UVB: CPT

## 2020-11-03 PROCEDURE — 99072 ADDL SUPL MATRL&STAF TM PHE: CPT

## 2020-11-03 PROCEDURE — D0181: CPT

## 2020-11-05 ENCOUNTER — APPOINTMENT (OUTPATIENT)
Dept: DERMATOLOGY | Facility: CLINIC | Age: 35
End: 2020-11-05
Payer: MEDICAID

## 2020-11-05 PROCEDURE — 96910 PHOTCHMTX TAR&UVB/PTRLTM&UVB: CPT

## 2020-11-05 PROCEDURE — 99072 ADDL SUPL MATRL&STAF TM PHE: CPT

## 2020-11-09 ENCOUNTER — APPOINTMENT (OUTPATIENT)
Dept: DERMATOLOGY | Facility: CLINIC | Age: 35
End: 2020-11-09
Payer: MEDICAID

## 2020-11-09 VITALS — TEMPERATURE: 98.2 F

## 2020-11-09 PROCEDURE — 99072 ADDL SUPL MATRL&STAF TM PHE: CPT

## 2020-11-09 PROCEDURE — 96910 PHOTCHMTX TAR&UVB/PTRLTM&UVB: CPT

## 2020-11-11 ENCOUNTER — APPOINTMENT (OUTPATIENT)
Dept: DERMATOLOGY | Facility: CLINIC | Age: 35
End: 2020-11-11

## 2020-11-13 ENCOUNTER — APPOINTMENT (OUTPATIENT)
Dept: DERMATOLOGY | Facility: CLINIC | Age: 35
End: 2020-11-13

## 2020-11-16 ENCOUNTER — APPOINTMENT (OUTPATIENT)
Dept: DERMATOLOGY | Facility: CLINIC | Age: 35
End: 2020-11-16

## 2020-11-17 ENCOUNTER — NON-APPOINTMENT (OUTPATIENT)
Age: 35
End: 2020-11-17

## 2020-11-18 ENCOUNTER — APPOINTMENT (OUTPATIENT)
Dept: DERMATOLOGY | Facility: CLINIC | Age: 35
End: 2020-11-18
Payer: MEDICAID

## 2020-11-18 VITALS — TEMPERATURE: 96.5 F

## 2020-11-18 PROCEDURE — 96910 PHOTCHMTX TAR&UVB/PTRLTM&UVB: CPT

## 2020-11-20 ENCOUNTER — APPOINTMENT (OUTPATIENT)
Dept: DERMATOLOGY | Facility: CLINIC | Age: 35
End: 2020-11-20
Payer: MEDICAID

## 2020-11-20 ENCOUNTER — APPOINTMENT (OUTPATIENT)
Dept: DERMATOLOGY | Facility: CLINIC | Age: 35
End: 2020-11-20

## 2020-11-20 PROCEDURE — 96910 PHOTCHMTX TAR&UVB/PTRLTM&UVB: CPT

## 2020-11-23 ENCOUNTER — APPOINTMENT (OUTPATIENT)
Dept: DERMATOLOGY | Facility: CLINIC | Age: 35
End: 2020-11-23
Payer: MEDICAID

## 2020-11-23 ENCOUNTER — APPOINTMENT (OUTPATIENT)
Dept: DERMATOLOGY | Facility: CLINIC | Age: 35
End: 2020-11-23

## 2020-11-23 PROCEDURE — 96910 PHOTCHMTX TAR&UVB/PTRLTM&UVB: CPT

## 2020-11-25 ENCOUNTER — APPOINTMENT (OUTPATIENT)
Dept: DERMATOLOGY | Facility: CLINIC | Age: 35
End: 2020-11-25

## 2020-11-27 ENCOUNTER — APPOINTMENT (OUTPATIENT)
Dept: DERMATOLOGY | Facility: CLINIC | Age: 35
End: 2020-11-27

## 2020-11-30 ENCOUNTER — APPOINTMENT (OUTPATIENT)
Dept: DERMATOLOGY | Facility: CLINIC | Age: 35
End: 2020-11-30

## 2020-12-02 ENCOUNTER — APPOINTMENT (OUTPATIENT)
Dept: DERMATOLOGY | Facility: CLINIC | Age: 35
End: 2020-12-02

## 2020-12-04 ENCOUNTER — APPOINTMENT (OUTPATIENT)
Dept: DERMATOLOGY | Facility: CLINIC | Age: 35
End: 2020-12-04

## 2020-12-07 ENCOUNTER — APPOINTMENT (OUTPATIENT)
Dept: DERMATOLOGY | Facility: CLINIC | Age: 35
End: 2020-12-07

## 2020-12-07 RX ORDER — CLOBETASOL PROPIONATE 0.5 MG/ML
0.05 SOLUTION TOPICAL
Qty: 2 | Refills: 6 | Status: ACTIVE | COMMUNITY
Start: 2020-09-01 | End: 1900-01-01

## 2020-12-09 ENCOUNTER — APPOINTMENT (OUTPATIENT)
Dept: DERMATOLOGY | Facility: CLINIC | Age: 35
End: 2020-12-09

## 2020-12-09 ENCOUNTER — APPOINTMENT (OUTPATIENT)
Dept: RHEUMATOLOGY | Facility: CLINIC | Age: 35
End: 2020-12-09
Payer: MEDICAID

## 2020-12-09 PROCEDURE — 99214 OFFICE O/P EST MOD 30 MIN: CPT | Mod: 95

## 2020-12-09 RX ORDER — GUSELKUMAB 100 MG/ML
100 INJECTION SUBCUTANEOUS
Qty: 2 | Refills: 0 | Status: DISCONTINUED | OUTPATIENT
Start: 2020-09-28 | End: 2020-12-09

## 2020-12-09 NOTE — REVIEW OF SYSTEMS
[Abdominal Pain] : abdominal pain [As Noted in HPI] : as noted in HPI [Arthralgias] : arthralgias [Joint Pain] : joint pain [Negative] : Heme/Lymph [FreeTextEntry2] : frustrated with degree of rash.. and recent colitis  [FreeTextEntry3] : recurrent stye but no inflammatory eye dz [FreeTextEntry7] : 9/23/20 severe abd pain - to ER dx colitis, thought to be AI, analgesics w/ + response, no anemia, sx improved  [FreeTextEntry8] : Still breast feeding, no menses, doesn't want to stop  [FreeTextEntry9] : knees most consistently but R shoulder limited ROM worse last yr, dramatic improvement  [de-identified] : poorly controlled, several active lesions worse over legs but noted "everywhere" to include scalp, inner ear, breasts;

## 2020-12-09 NOTE — ASSESSMENT
[FreeTextEntry1] : \par 34 yo female with long hx Psorasis x 7 ys.. only recently significant flare... - progressively worse...following pregnancy.. and new onset colitis 9/23/20.. thought to be AI r/t, now nearly fully resolved .. and progressive increase in joint pain (though no overt swelling). Here for rheum eval PsA. \par \par 1) Psoriasis:  moderate, poorly controlled and worse following recent pregnancy.  Associated with recent onset of colitis and joint pain/ stiffness over past few mnths.  Updated GI w/u negative colonoscopy- no evidence of colitis.. and recent joint sx fully controlled but progressive worsening of psoraisis now covering > 40% body.. \par Has tried and failed to control rash w/ topical agents or phototx. \par 6m post partum, would like to continue to breast feed. \par - Colitis:  pain resolved, no bloody stools and nl CBC has appt w/ GI tomorrow 10/20 negative  colonoscopy to be scheduled .. \par - minimal joint sx appreciated today, can't say that adhesive capsulitis 2019 wasn't r/t but otherwise minimal evidence of enthesopathy now or in past.  Little to suggest PsA at this point\par Does admit to tremendous stress as well.. working FT as well, currently working as a single parent.. 1 yo, 6m and recent postpartum status.. \par NOTE:  \par + FH brother w/ psoriasis... \par \par Discussion: Tremfya wasn't approved and would rather not consider given desire to continue to breast feed. Will try to get Cimzia approved. If not is willing if absolutely necessary to do whatever is needed to control the rash. Very frustrated.. \par \par Plan: \par -  get PA for Cimzia, if not approved need to see what insurance is willing to consider \par  \par

## 2020-12-09 NOTE — PHYSICAL EXAM
[General Appearance - Alert] : alert [General Appearance - In No Acute Distress] : in no acute distress [General Appearance - Well Nourished] : well nourished [General Appearance - Well Developed] : well developed [General Appearance - Well-Appearing] : healthy appearing [Thyroid Nodule] : there were no palpable thyroid nodules [Edema] : there was no peripheral edema [Abnormal Walk] : normal gait [Nail Clubbing] : no clubbing  or cyanosis of the fingernails [Musculoskeletal - Swelling] : no joint swelling seen [Motor Tone] : muscle strength and tone were normal [No Focal Deficits] : no focal deficits [Oriented To Time, Place, And Person] : oriented to person, place, and time [Impaired Insight] : insight and judgment were intact [Affect] : the affect was normal [Occult Blood Positive] : stool was negative for occult blood [FreeTextEntry1] : anxious about persistent sx and wants to get them under control quickly

## 2020-12-09 NOTE — REASON FOR VISIT
[Consultation] : a consultation visit [FreeTextEntry1] : new onset colitis, increase in psoriasis and joint pain

## 2020-12-09 NOTE — HISTORY OF PRESENT ILLNESS
[Other Location: e.g. School (Enter Location, City,State)___] : at [unfilled], at the time of the visit. [Medical Office: ([facility])___] : at the medical office located in  [Verbal consent obtained from patient] : the patient, [unfilled] [FreeTextEntry1] : Verbal consent given on 12/09/2020 at 18:13 by ALBAN BARRON, [].\par AMWeLL\par \par 12/9/20 \par - rash poorly controlled, considerable increase over past few months, trouble getting in for phototx.. no consistency, topical agents and wraps not working.. VERY frustrated. \par - no active joint or GI sx. Colonoscopy negative for colitis.  \par - Tremfya was not covered.. \par - very frustrated \par \par 1) Psoriasis w/ hx of intermittent arthropathy\par ______________________________________________________________________________\par \par \par (Iinitial HPI 10/14/20) \par 36 yo [place] female from [place] \par Long hx Psorasis x 7 ys.. only recently significant flare... - progressively worse...following pregnancy.. \par Admits to tremendous stress as well.. working FT as well, currently working as a single parent.. \par 3 yo, 4 m\par Still breastfeeding and doesn't want to stop if possible.  Also considering future pregnancy..\par Joint sx:  knee pain and stiffness.. mild swelling observed... am stiffness mild ... joint sx actually get worse with use.. Shoulder stiffness slightly R > L (only recently vaccinated).. multiple complications following a vaccine 2018... R shoulder continues to be problematic since then... Adhesive capsulitis.. but otherwise denies recurrent enthesopathies/ dactylitis \par Colitis: 3 wks ago ..  severe abd pain, CTScan + inflammation no obvious blood in stools... treated w/ MSO4..  better now .. colonoscopy pending. \par At this point rash # issues.. colitis nearly fully resolved and minimal joint pain/ mild inflammatory back symptoms.. . Frustrated w/ poorly controlled psoriasis. No previous tx beyond topicals with limited benefit over past several ys \par Hx of recurrent styes but no previous inflammatory eye symptoms otherwise.  \par + FH brother w/ psoriasis... \par ROS:  denies fevers, chills, night sweats, lymphadenopathy, arthropathy, alopecia, raynauds, headaches, vision loss, sicca, mucositis, serositis, cp, sob, cough, GERD, n/v/d/c or abd bloating, bleeding (significant blood loss after 1st pregnancy- c section)/ clotting dyscrasias or cytopenias, paresthesias or weakness, renal or hepatic dysfunction. \par \par PSH\par L ankle ORIF - fractured playing basketball no limitations now, occas swelling \par \par SH:  no ETOH, smoking \par parents help with childcare \par

## 2020-12-11 ENCOUNTER — APPOINTMENT (OUTPATIENT)
Dept: DERMATOLOGY | Facility: CLINIC | Age: 35
End: 2020-12-11

## 2020-12-14 ENCOUNTER — APPOINTMENT (OUTPATIENT)
Dept: DERMATOLOGY | Facility: CLINIC | Age: 35
End: 2020-12-14

## 2020-12-16 ENCOUNTER — APPOINTMENT (OUTPATIENT)
Dept: DERMATOLOGY | Facility: CLINIC | Age: 35
End: 2020-12-16

## 2020-12-18 ENCOUNTER — APPOINTMENT (OUTPATIENT)
Dept: DERMATOLOGY | Facility: CLINIC | Age: 35
End: 2020-12-18

## 2020-12-21 ENCOUNTER — APPOINTMENT (OUTPATIENT)
Dept: DERMATOLOGY | Facility: CLINIC | Age: 35
End: 2020-12-21

## 2020-12-23 ENCOUNTER — APPOINTMENT (OUTPATIENT)
Dept: DERMATOLOGY | Facility: CLINIC | Age: 35
End: 2020-12-23

## 2020-12-28 ENCOUNTER — APPOINTMENT (OUTPATIENT)
Dept: DERMATOLOGY | Facility: CLINIC | Age: 35
End: 2020-12-28

## 2020-12-30 ENCOUNTER — APPOINTMENT (OUTPATIENT)
Dept: DERMATOLOGY | Facility: CLINIC | Age: 35
End: 2020-12-30

## 2021-01-04 ENCOUNTER — APPOINTMENT (OUTPATIENT)
Dept: DERMATOLOGY | Facility: CLINIC | Age: 36
End: 2021-01-04

## 2021-01-11 ENCOUNTER — APPOINTMENT (OUTPATIENT)
Dept: GASTROENTEROLOGY | Facility: CLINIC | Age: 36
End: 2021-01-11

## 2021-01-14 NOTE — ED PROVIDER NOTE - CCCP TRG CHIEF CMPLNT
[Home] : at home, [unfilled] , at the time of the visit. [Medical Office: ([facility])___] : at the medical office located in  abdominal pain [Spouse] : spouse [Verbal consent obtained from patient] : the patient, [unfilled]

## 2021-02-11 ENCOUNTER — APPOINTMENT (OUTPATIENT)
Dept: RHEUMATOLOGY | Facility: CLINIC | Age: 36
End: 2021-02-11
Payer: MEDICAID

## 2021-02-11 PROCEDURE — 99213 OFFICE O/P EST LOW 20 MIN: CPT | Mod: 95

## 2021-02-11 NOTE — ASSESSMENT
[FreeTextEntry1] : \par 34 yo female with long hx Psorasis x 7 ys.. only recently significant flare... - progressively worse...following pregnancy.. and new onset colitis 9/23/20.. thought to be AI r/t, now nearly fully resolved .. and progressive increase in joint pain (though no overt swelling). Here for rheum eval PsA. \par \par 1) Psoriasis w/ possible inflammatory bowel and occas joint sx:  moderate, poorly controlled and worse following recent pregnancy.  Associated with recent onset of colitis and joint pain/ stiffness over past few mnths.  Updated GI w/u negative colonoscopy- . and recent joint sx fully controlled but progressive worsening of psoraisis now covering > 40% body.. \par Has tried and failed to control rash w/ topical agents or phototx. \par 6m post partum, would like to continue to breast feed, but actually stopped 2/2 work  \par - Colitis:  pain resolved, no bloody stools and nl CBC has appt w/ GI tomorrow 10/20 negative  colonoscopy to be scheduled .. \par - minimal joint sx appreciated today, can't say that adhesive capsulitis 2019 wasn't r/t but otherwise minimal evidence of enthesopathy now or in past.  Little to suggest PsA at this point\par Does admit to tremendous stress as well.. working FT as well, currently working as a single parent.. 1 yo, 6m \par NOTE:  \par + FH brother w/ psoriasis... \par \par Discussion: Tremfya wasn't approved.  Cimzia approved and here for injection teaching.  Demonstrated 2 SQ injections successfully after reviewing and examination of syringe, cleansing of skin, insuring that there is  no active infections (skin lesions, chills/ fever/ malaise, URI/ n/v/d) -\par Advised to hold injection if any concern for infection or skin wounds\par Advised to call with any questions or problems in future.  .. \par \par Plan: \par -  reviewed loading dose Cimzia 400 mg wk 0- now then 2, 4 wk then follow with 200 mg every 2 wk long term. Verbalized understanding.  Will call if any issues/ questions. \par \par - needs f/u appt in persons, but will see [place] is too far away.  \par \par - if needed can intermittently f/u via telehealth\par \par - discussed possible need for more than one therapy, this is not uncommon\par \par - reviewed need to call immediately if inflammatory back, bowel, eye or joint disease presents \par  \par

## 2021-02-11 NOTE — REVIEW OF SYSTEMS
[Abdominal Pain] : abdominal pain [As Noted in HPI] : as noted in HPI [Arthralgias] : arthralgias [Joint Pain] : joint pain [Negative] : Heme/Lymph [FreeTextEntry2] : frustrated with degree of rash.. and recent colitis - now resolved [FreeTextEntry3] : recurrent stye but no inflammatory eye dz [FreeTextEntry7] : 9/23/20 severe abd pain - to ER dx colitis, thought to be AI, analgesics w/ + response, no anemia, sx improved - without return [FreeTextEntry8] : Still  breast feeding...  no menses, doesn't want to stop  [FreeTextEntry9] : knees most consistently but R shoulder limited ROM worse last yr, dramatic improvement  [de-identified] : poorly controlled, several active lesions worse over legs but noted "everywhere" to include scalp, inner ear, breasts;

## 2021-02-11 NOTE — HISTORY OF PRESENT ILLNESS
[FreeTextEntry1] : Verbal consent given on 02/11/2021 at 18:53 by ALBAN BARRON, [].\par Amwell \par \par 2/11/21\par - rash somewhat better after sun exposure but still present despite topical and wraps. VERY frustrated. Legs diffusely and scalp - and arms/ back.. \par - no active joint or GI sx. Colonoscopy negative for colitis (update)\par - stopped breast feeding 2/2 travel \par - Tremfya was not covered.. recently approved for Cimzia SQ- self injection, here for demonstration\par \par 1) Psoriasis w/ hx of intermittent arthropathy\par ______________________________________________________________________________\par \par \par (Iinitial HPI 10/14/20) \par 36 yo [place] female from [place] \par Long hx Psorasis x 7 ys.. only recently significant flare... - progressively worse...following pregnancy.. \par Admits to tremendous stress as well.. working FT as well, currently working as a single parent.. \par 1 yo, 4 m\par Still breastfeeding and doesn't want to stop if possible.  Also considering future pregnancy..\par Joint sx:  knee pain and stiffness.. mild swelling observed... am stiffness mild ... joint sx actually get worse with use.. Shoulder stiffness slightly R > L (only recently vaccinated).. multiple complications following a vaccine 2018... R shoulder continues to be problematic since then... Adhesive capsulitis.. but otherwise denies recurrent enthesopathies/ dactylitis \par Colitis: 3 wks ago ..  severe abd pain, CTScan + inflammation no obvious blood in stools... treated w/ MSO4..  better now .. colonoscopy pending. \par At this point rash # issues.. colitis nearly fully resolved and minimal joint pain/ mild inflammatory back symptoms.. . Frustrated w/ poorly controlled psoriasis. No previous tx beyond topicals with limited benefit over past several ys \par Hx of recurrent styes but no previous inflammatory eye symptoms otherwise.  \par + FH brother w/ psoriasis... \par ROS:  denies fevers, chills, night sweats, lymphadenopathy, arthropathy, alopecia, raynauds, headaches, vision loss, sicca, mucositis, serositis, cp, sob, cough, GERD, n/v/d/c or abd bloating, bleeding (significant blood loss after 1st pregnancy- c section)/ clotting dyscrasias or cytopenias, paresthesias or weakness, renal or hepatic dysfunction. \par \par PSH\par L ankle ORIF - fractured playing basketball no limitations now, occas swelling \par \par SH:  no ETOH, smoking \par parents help with childcare \par  [Other Location: e.g. School (Enter Location, City,State)___] : at [unfilled], at the time of the visit. [Medical Office: ([facility])___] : at the medical office located in  [Verbal consent obtained from patient] : the patient, [unfilled]

## 2021-02-11 NOTE — PHYSICAL EXAM
[General Appearance - Alert] : alert [General Appearance - In No Acute Distress] : in no acute distress [General Appearance - Well Nourished] : well nourished [General Appearance - Well Developed] : well developed [General Appearance - Well-Appearing] : healthy appearing [Thyroid Nodule] : there were no palpable thyroid nodules [Edema] : there was no peripheral edema [Occult Blood Positive] : stool was negative for occult blood [Abnormal Walk] : normal gait [Nail Clubbing] : no clubbing  or cyanosis of the fingernails [Musculoskeletal - Swelling] : no joint swelling seen [Motor Tone] : muscle strength and tone were normal [No Focal Deficits] : no focal deficits [Oriented To Time, Place, And Person] : oriented to person, place, and time [Impaired Insight] : insight and judgment were intact [Affect] : the affect was normal [FreeTextEntry1] : anxious about persistent sx and wants to get them under control quickly

## 2021-03-19 ENCOUNTER — APPOINTMENT (OUTPATIENT)
Dept: RHEUMATOLOGY | Facility: CLINIC | Age: 36
End: 2021-03-19

## 2021-03-22 ENCOUNTER — TRANSCRIPTION ENCOUNTER (OUTPATIENT)
Age: 36
End: 2021-03-22

## 2021-03-23 ENCOUNTER — APPOINTMENT (OUTPATIENT)
Dept: RHEUMATOLOGY | Facility: CLINIC | Age: 36
End: 2021-03-23
Payer: MEDICAID

## 2021-03-23 ENCOUNTER — NON-APPOINTMENT (OUTPATIENT)
Age: 36
End: 2021-03-23

## 2021-03-23 PROCEDURE — 99213 OFFICE O/P EST LOW 20 MIN: CPT | Mod: 95

## 2021-03-24 ENCOUNTER — NON-APPOINTMENT (OUTPATIENT)
Age: 36
End: 2021-03-24

## 2021-03-24 DIAGNOSIS — M25.50 PAIN IN UNSPECIFIED JOINT: ICD-10-CM

## 2021-03-24 DIAGNOSIS — L40.9 PSORIASIS, UNSPECIFIED: ICD-10-CM

## 2021-03-29 RX ORDER — CERTOLIZUMAB PEGOL 200 MG/ML
2 X 200 INJECTION, SOLUTION SUBCUTANEOUS
Qty: 3 | Refills: 3 | Status: ACTIVE | COMMUNITY
Start: 2020-12-09

## 2021-04-01 ENCOUNTER — NON-APPOINTMENT (OUTPATIENT)
Age: 36
End: 2021-04-01

## 2022-03-22 NOTE — ED ADULT NURSE NOTE - ISOLATION TYPE:
None Double O-Z Flap Text: The defect edges were debeveled with a #15 scalpel blade.  Given the location of the defect, shape of the defect and the proximity to free margins a Double O-Z flap was deemed most appropriate.  Using a sterile surgical marker, an appropriate transposition flap was drawn incorporating the defect and placing the expected incisions within the relaxed skin tension lines where possible. The area thus outlined was incised deep to adipose tissue with a #15 scalpel blade.  The skin margins were undermined to an appropriate distance in all directions utilizing iris scissors.

## 2023-05-04 NOTE — ED ADULT TRIAGE NOTE - PAIN RATING/NUMBER SCALE (0-10): REST
8 Positioning (Leave Blank If You Do Not Want): The patient was placed in a comfortable position exposing the surgical site.
